# Patient Record
Sex: MALE | Race: WHITE | NOT HISPANIC OR LATINO | Employment: OTHER | URBAN - METROPOLITAN AREA
[De-identification: names, ages, dates, MRNs, and addresses within clinical notes are randomized per-mention and may not be internally consistent; named-entity substitution may affect disease eponyms.]

---

## 2023-02-20 LAB
ALBUMIN (G/DL) IN SER/PLAS: 4.5 G/DL (ref 3.4–5)
ANION GAP IN SER/PLAS: 10 MMOL/L (ref 10–20)
CALCIUM (MG/DL) IN SER/PLAS: 9.8 MG/DL (ref 8.6–10.3)
CARBON DIOXIDE, TOTAL (MMOL/L) IN SER/PLAS: 32 MMOL/L (ref 21–32)
CHLORIDE (MMOL/L) IN SER/PLAS: 102 MMOL/L (ref 98–107)
CREATININE (MG/DL) IN SER/PLAS: 1.34 MG/DL (ref 0.5–1.3)
GFR MALE: 57 ML/MIN/1.73M2
GLUCOSE (MG/DL) IN SER/PLAS: 63 MG/DL (ref 74–99)
PHOSPHATE (MG/DL) IN SER/PLAS: 4 MG/DL (ref 2.5–4.9)
POTASSIUM (MMOL/L) IN SER/PLAS: 3.9 MMOL/L (ref 3.5–5.3)
SODIUM (MMOL/L) IN SER/PLAS: 140 MMOL/L (ref 136–145)
UREA NITROGEN (MG/DL) IN SER/PLAS: 40 MG/DL (ref 6–23)

## 2023-03-07 LAB
ALBUMIN (G/DL) IN SER/PLAS: 4 G/DL (ref 3.4–5)
ANION GAP IN SER/PLAS: 10 MMOL/L (ref 10–20)
CALCIUM (MG/DL) IN SER/PLAS: 9 MG/DL (ref 8.6–10.3)
CARBON DIOXIDE, TOTAL (MMOL/L) IN SER/PLAS: 30 MMOL/L (ref 21–32)
CHLORIDE (MMOL/L) IN SER/PLAS: 105 MMOL/L (ref 98–107)
CREATININE (MG/DL) IN SER/PLAS: 1.08 MG/DL (ref 0.5–1.3)
GFR MALE: 74 ML/MIN/1.73M2
GLUCOSE (MG/DL) IN SER/PLAS: 96 MG/DL (ref 74–99)
PHOSPHATE (MG/DL) IN SER/PLAS: 3.1 MG/DL (ref 2.5–4.9)
POTASSIUM (MMOL/L) IN SER/PLAS: 4 MMOL/L (ref 3.5–5.3)
SODIUM (MMOL/L) IN SER/PLAS: 141 MMOL/L (ref 136–145)
UREA NITROGEN (MG/DL) IN SER/PLAS: 33 MG/DL (ref 6–23)

## 2023-03-09 PROBLEM — N32.81 OVERACTIVE BLADDER: Status: ACTIVE | Noted: 2023-03-09

## 2023-03-09 PROBLEM — K21.9 GERD (GASTROESOPHAGEAL REFLUX DISEASE): Status: ACTIVE | Noted: 2023-03-09

## 2023-03-09 PROBLEM — B35.4 TINEA CORPORIS: Status: ACTIVE | Noted: 2023-03-09

## 2023-03-09 PROBLEM — M79.674 CHRONIC PAIN OF TOE OF RIGHT FOOT: Status: ACTIVE | Noted: 2023-03-09

## 2023-03-09 PROBLEM — R47.1 ATAXIC DYSARTHRIA: Status: ACTIVE | Noted: 2023-03-09

## 2023-03-09 PROBLEM — G20.C PARKINSONISM (MULTI): Status: ACTIVE | Noted: 2023-03-09

## 2023-03-09 PROBLEM — M25.552 HIP PAIN, LEFT: Status: ACTIVE | Noted: 2023-03-09

## 2023-03-09 PROBLEM — R27.0 ATAXIA: Status: ACTIVE | Noted: 2023-03-09

## 2023-03-09 PROBLEM — M25.561 KNEE PAIN, RIGHT: Status: ACTIVE | Noted: 2023-03-09

## 2023-03-09 PROBLEM — R13.10 DYSPHAGIA: Status: ACTIVE | Noted: 2023-03-09

## 2023-03-09 PROBLEM — R20.2 NUMBNESS AND TINGLING: Status: ACTIVE | Noted: 2023-03-09

## 2023-03-09 PROBLEM — R26.89 BALANCE PROBLEM: Status: ACTIVE | Noted: 2023-03-09

## 2023-03-09 PROBLEM — N40.0 BPH (BENIGN PROSTATIC HYPERPLASIA): Status: ACTIVE | Noted: 2023-03-09

## 2023-03-09 PROBLEM — G62.9 POLYNEUROPATHY: Status: ACTIVE | Noted: 2023-03-09

## 2023-03-09 PROBLEM — G23.8 MULTIPLE SYSTEM ATROPHY (MULTI): Status: ACTIVE | Noted: 2023-03-09

## 2023-03-09 PROBLEM — E03.9 HYPOTHYROIDISM: Status: ACTIVE | Noted: 2023-03-09

## 2023-03-09 PROBLEM — A60.00 GENITAL HERPES: Status: ACTIVE | Noted: 2023-03-09

## 2023-03-09 PROBLEM — I78.1 TELANGIECTASIA: Status: ACTIVE | Noted: 2023-03-09

## 2023-03-09 PROBLEM — L30.9 ECZEMA: Status: ACTIVE | Noted: 2023-03-09

## 2023-03-09 PROBLEM — E78.5 BORDERLINE HYPERLIPIDEMIA: Status: ACTIVE | Noted: 2023-03-09

## 2023-03-09 PROBLEM — R55 SYNCOPE AND COLLAPSE: Status: ACTIVE | Noted: 2023-03-09

## 2023-03-09 PROBLEM — R26.81 UNSTEADY GAIT: Status: ACTIVE | Noted: 2023-03-09

## 2023-03-09 PROBLEM — R53.1 WEAKNESS GENERALIZED: Status: ACTIVE | Noted: 2023-03-09

## 2023-03-09 PROBLEM — R13.13 PHARYNGEAL DYSPHAGIA: Status: ACTIVE | Noted: 2023-03-09

## 2023-03-09 PROBLEM — G90.3 MULTIPLE SYSTEM ATROPHY (MULTI): Status: ACTIVE | Noted: 2023-03-09

## 2023-03-09 PROBLEM — R20.0 NUMBNESS AND TINGLING: Status: ACTIVE | Noted: 2023-03-09

## 2023-03-09 PROBLEM — R29.6 REPEATED FALLS: Status: ACTIVE | Noted: 2023-03-09

## 2023-03-09 PROBLEM — G89.29 CHRONIC PAIN OF TOE OF LEFT FOOT: Status: ACTIVE | Noted: 2023-03-09

## 2023-03-09 PROBLEM — R26.2 IMPAIRED AMBULATION: Status: ACTIVE | Noted: 2023-03-09

## 2023-03-09 PROBLEM — G89.29 CHRONIC PAIN OF TOE OF RIGHT FOOT: Status: ACTIVE | Noted: 2023-03-09

## 2023-03-09 PROBLEM — G11.9 CEREBELLAR ATAXIA (MULTI): Status: ACTIVE | Noted: 2023-03-09

## 2023-03-09 PROBLEM — R27.8 DECREASED COORDINATION: Status: ACTIVE | Noted: 2023-03-09

## 2023-03-09 PROBLEM — M79.675 CHRONIC PAIN OF TOE OF LEFT FOOT: Status: ACTIVE | Noted: 2023-03-09

## 2023-03-09 PROBLEM — I87.2 VENOUS INSUFFICIENCY: Status: ACTIVE | Noted: 2023-03-09

## 2023-03-09 PROBLEM — I95.1 ORTHOSTATIC HYPOTENSION: Status: ACTIVE | Noted: 2023-03-09

## 2023-03-09 RX ORDER — VALACYCLOVIR HYDROCHLORIDE 500 MG/1
1 TABLET, FILM COATED ORAL DAILY
COMMUNITY
Start: 2021-02-04 | End: 2023-08-29 | Stop reason: SDUPTHER

## 2023-03-09 RX ORDER — LEVOTHYROXINE SODIUM 25 UG/1
1 TABLET ORAL DAILY
COMMUNITY
Start: 2021-02-04 | End: 2023-05-10 | Stop reason: SDUPTHER

## 2023-03-09 RX ORDER — FLUDROCORTISONE ACETATE 0.1 MG/1
TABLET ORAL
COMMUNITY
Start: 2021-11-29 | End: 2023-06-12 | Stop reason: SDUPTHER

## 2023-03-09 RX ORDER — LANSOPRAZOLE 30 MG/1
1 CAPSULE, DELAYED RELEASE ORAL 2 TIMES DAILY
COMMUNITY
Start: 2021-03-12 | End: 2023-04-17 | Stop reason: SDUPTHER

## 2023-03-09 RX ORDER — TAMSULOSIN HYDROCHLORIDE 0.4 MG/1
0.8 CAPSULE ORAL
COMMUNITY
Start: 2022-10-04 | End: 2023-11-16 | Stop reason: SDUPTHER

## 2023-03-15 ENCOUNTER — APPOINTMENT (OUTPATIENT)
Dept: PRIMARY CARE | Facility: CLINIC | Age: 68
End: 2023-03-15
Payer: MEDICARE

## 2023-03-17 ENCOUNTER — APPOINTMENT (OUTPATIENT)
Dept: PRIMARY CARE | Facility: CLINIC | Age: 68
End: 2023-03-17
Payer: MEDICARE

## 2023-03-21 ENCOUNTER — OFFICE VISIT (OUTPATIENT)
Dept: PRIMARY CARE | Facility: CLINIC | Age: 68
End: 2023-03-21
Payer: MEDICARE

## 2023-03-21 VITALS
SYSTOLIC BLOOD PRESSURE: 98 MMHG | DIASTOLIC BLOOD PRESSURE: 60 MMHG | BODY MASS INDEX: 24.22 KG/M2 | HEIGHT: 71 IN | HEART RATE: 63 BPM | WEIGHT: 173 LBS | RESPIRATION RATE: 18 BRPM | TEMPERATURE: 96.8 F | OXYGEN SATURATION: 96 %

## 2023-03-21 DIAGNOSIS — A60.00 GENITAL HERPES SIMPLEX, UNSPECIFIED SITE: ICD-10-CM

## 2023-03-21 DIAGNOSIS — G90.3 MULTIPLE SYSTEM ATROPHY (MULTI): ICD-10-CM

## 2023-03-21 DIAGNOSIS — R47.1 ATAXIC DYSARTHRIA: ICD-10-CM

## 2023-03-21 DIAGNOSIS — G11.9 CEREBELLAR ATAXIA (MULTI): ICD-10-CM

## 2023-03-21 DIAGNOSIS — K21.9 GASTROESOPHAGEAL REFLUX DISEASE, UNSPECIFIED WHETHER ESOPHAGITIS PRESENT: ICD-10-CM

## 2023-03-21 DIAGNOSIS — Z12.11 COLON CANCER SCREENING: ICD-10-CM

## 2023-03-21 DIAGNOSIS — E03.9 HYPOTHYROIDISM, UNSPECIFIED TYPE: ICD-10-CM

## 2023-03-21 DIAGNOSIS — N32.81 OVERACTIVE BLADDER: ICD-10-CM

## 2023-03-21 DIAGNOSIS — N40.0 BENIGN PROSTATIC HYPERPLASIA, UNSPECIFIED WHETHER LOWER URINARY TRACT SYMPTOMS PRESENT: ICD-10-CM

## 2023-03-21 DIAGNOSIS — E78.5 BORDERLINE HYPERLIPIDEMIA: ICD-10-CM

## 2023-03-21 DIAGNOSIS — G20.C PARKINSONISM, UNSPECIFIED PARKINSONISM TYPE (MULTI): ICD-10-CM

## 2023-03-21 DIAGNOSIS — R53.1 WEAKNESS GENERALIZED: ICD-10-CM

## 2023-03-21 DIAGNOSIS — G62.9 POLYNEUROPATHY: ICD-10-CM

## 2023-03-21 DIAGNOSIS — G23.8 MULTIPLE SYSTEM ATROPHY (MULTI): ICD-10-CM

## 2023-03-21 PROCEDURE — 1036F TOBACCO NON-USER: CPT | Performed by: FAMILY MEDICINE

## 2023-03-21 PROCEDURE — G0439 PPPS, SUBSEQ VISIT: HCPCS | Performed by: FAMILY MEDICINE

## 2023-03-21 PROCEDURE — 1170F FXNL STATUS ASSESSED: CPT | Performed by: FAMILY MEDICINE

## 2023-03-21 PROCEDURE — 1159F MED LIST DOCD IN RCRD: CPT | Performed by: FAMILY MEDICINE

## 2023-03-21 ASSESSMENT — ACTIVITIES OF DAILY LIVING (ADL)
TAKING_MEDICATION: INDEPENDENT
GROCERY_SHOPPING: TOTAL CARE
DOING_HOUSEWORK: TOTAL CARE
BATHING: INDEPENDENT
MANAGING_FINANCES: INDEPENDENT
DRESSING: INDEPENDENT

## 2023-03-21 ASSESSMENT — ENCOUNTER SYMPTOMS
DEPRESSION: 0
LOSS OF SENSATION IN FEET: 1
OCCASIONAL FEELINGS OF UNSTEADINESS: 1

## 2023-03-21 ASSESSMENT — PATIENT HEALTH QUESTIONNAIRE - PHQ9
SUM OF ALL RESPONSES TO PHQ9 QUESTIONS 1 AND 2: 0
2. FEELING DOWN, DEPRESSED OR HOPELESS: NOT AT ALL
1. LITTLE INTEREST OR PLEASURE IN DOING THINGS: NOT AT ALL

## 2023-03-21 NOTE — PATIENT INSTRUCTIONS

## 2023-03-21 NOTE — PROGRESS NOTES
"Subjective   Reason for Visit: Ismael Deras is an 67 y.o. male here for a Medicare Wellness visit.          Reviewed all medications by prescribing practitioner or clinical pharmacist (such as prescriptions, OTCs, herbal therapies and supplements) and documented in the medical record.    HPI  Pt is present for AWV and bilateral swollen feet x6 month  No pain but tingling sensation both feet    Pt fell 6 week ago hurt the back of his head and than 2 week after  That he fell again and hit front of the head and skinned forehead.     Patient Care Team:  Bobby Sylvester MD as PCP - General  Bobby Sylvester MD as PCP - [employer] PCP     Review of Systems    Objective   Vitals:  BP 98/60 (BP Location: Right arm, Patient Position: Sitting)   Pulse 63   Temp 36 °C (96.8 °F) (Temporal)   Resp 18   Ht 1.803 m (5' 11\")   Wt 78.5 kg (173 lb)   SpO2 96%   BMI 24.13 kg/m²       Physical Exam  Patient in for annual Medicare wellness exam history of ataxia with falls parkinsonism.  Following up with neurology in May still having dizziness and balance issues.  Complains of swelling in his legs and feet.  He is alert and oriented he is here with his sister who is his power of .  Lungs are clear cardiac exam regular rate and rhythm abdominal exam soft nontender no pedal splenomegaly or masses he has a history of BPH and overactive bladder seeing urology for that.  Mental status normal mood and affect ANO x3.  Neurologically he has difficulty walking uses a walker balance difficulties dizziness slowly progressing with this cerebellar ataxia progression.  Extremities he still has some mild edema below the ankles part of this is related to medication part of it is due to he refused to keep his feet up when he is sitting at home partly due to his being sedentary.  No diuretic therapy at this point it this is minimal skin is not in any danger of breakdown.    Plan is continue current " medication  Assessment/Plan no changes await recommendations by specialist routine recheck with me  Problem List Items Addressed This Visit          Nervous    Ataxic dysarthria    Cerebellar ataxia (CMS/HCC)    Multiple system atrophy (CMS/HCC)    Parkinsonism (CMS/HCC)    Polyneuropathy       Digestive    GERD (gastroesophageal reflux disease)       Genitourinary    BPH (benign prostatic hyperplasia)    Genital herpes    Overactive bladder       Endocrine/Metabolic    Hypothyroidism       Other    Borderline hyperlipidemia    Weakness generalized     Other Visit Diagnoses       Colon cancer screening                   Patient was identified as a fall risk. Risk prevention instructions provided.

## 2023-04-17 DIAGNOSIS — K21.9 GASTROESOPHAGEAL REFLUX DISEASE, UNSPECIFIED WHETHER ESOPHAGITIS PRESENT: ICD-10-CM

## 2023-04-17 RX ORDER — LANSOPRAZOLE 30 MG/1
CAPSULE, DELAYED RELEASE ORAL
Qty: 180 CAPSULE | Refills: 1 | Status: SHIPPED | OUTPATIENT
Start: 2023-04-17 | End: 2023-11-16 | Stop reason: SDUPTHER

## 2023-04-17 RX ORDER — PANTOPRAZOLE SODIUM 40 MG/1
40 TABLET, DELAYED RELEASE ORAL
Qty: 90 TABLET | Refills: 1 | Status: SHIPPED | OUTPATIENT
Start: 2023-04-17 | End: 2023-11-16 | Stop reason: SDUPTHER

## 2023-04-17 RX ORDER — LANSOPRAZOLE 30 MG/1
30 CAPSULE, DELAYED RELEASE ORAL 2 TIMES DAILY
Qty: 180 CAPSULE | Refills: 1 | Status: SHIPPED | OUTPATIENT
Start: 2023-04-17 | End: 2023-11-16 | Stop reason: WASHOUT

## 2023-04-17 RX ORDER — LANSOPRAZOLE 30 MG/1
30 CAPSULE, DELAYED RELEASE ORAL 2 TIMES DAILY
Qty: 180 CAPSULE | Refills: 1 | Status: SHIPPED | OUTPATIENT
Start: 2023-04-17 | End: 2023-04-17

## 2023-04-17 NOTE — TELEPHONE ENCOUNTER
Rx Refill Request Telephone Encounter    Name:  Ismael Deras  : 1955     Medication Name:  ansoprazole (Prevacid)  Dose (Optional):    30 MG  Quantity (Optional):    180  Directions (Optional):   Take 1 capsule (30 mg) by mouth in the morning and 1 capsule (30 mg) before bedtime.    ALLERGIES:   NO KNOWN ALLERGIES     Specific Pharmacy location:  [facility]    Date of last appointment:  3/21/23  Date of next appointment:      Best number to reach patient:  [phone]

## 2023-04-17 NOTE — TELEPHONE ENCOUNTER
Pt is calling because this rx for Prevacid was supposed to be sent to [facility]  Please send to [facility]    Thanks

## 2023-04-17 NOTE — TELEPHONE ENCOUNTER
Rx Refill Request Telephone Encounter    Name:  Ismael Deras  : 1955     Medication Name:  lansoprazole (Prevacid)  Dose (Optional):    30 MG  Quantity (Optional):    90  Directions (Optional):   Take 1 capsule (30 mg) by mouth in the morning and 1 capsule (30 mg) before bedtime.    ALLERGIES:   NO KNOWN ALLERGIES     Specific Pharmacy location:  [facility]    Date of last appointment:  3/21/2023  Date of next appointment:      Best number to reach patient:  [phone]

## 2023-04-27 ENCOUNTER — LAB (OUTPATIENT)
Dept: LAB | Facility: LAB | Age: 68
End: 2023-04-27
Payer: MEDICARE

## 2023-04-27 DIAGNOSIS — N32.81 OVERACTIVE BLADDER: ICD-10-CM

## 2023-04-27 DIAGNOSIS — E78.5 BORDERLINE HYPERLIPIDEMIA: ICD-10-CM

## 2023-04-27 DIAGNOSIS — E03.9 HYPOTHYROIDISM, UNSPECIFIED TYPE: ICD-10-CM

## 2023-04-27 DIAGNOSIS — N40.0 BENIGN PROSTATIC HYPERPLASIA, UNSPECIFIED WHETHER LOWER URINARY TRACT SYMPTOMS PRESENT: ICD-10-CM

## 2023-04-27 LAB
ALANINE AMINOTRANSFERASE (SGPT) (U/L) IN SER/PLAS: 14 U/L (ref 10–52)
ALBUMIN (G/DL) IN SER/PLAS: 4.3 G/DL (ref 3.4–5)
ALKALINE PHOSPHATASE (U/L) IN SER/PLAS: 103 U/L (ref 33–136)
ANION GAP IN SER/PLAS: 11 MMOL/L (ref 10–20)
ASPARTATE AMINOTRANSFERASE (SGOT) (U/L) IN SER/PLAS: 17 U/L (ref 9–39)
BASOPHILS (10*3/UL) IN BLOOD BY AUTOMATED COUNT: 0.04 X10E9/L (ref 0–0.1)
BASOPHILS/100 LEUKOCYTES IN BLOOD BY AUTOMATED COUNT: 0.6 % (ref 0–2)
BILIRUBIN TOTAL (MG/DL) IN SER/PLAS: 0.7 MG/DL (ref 0–1.2)
CALCIUM (MG/DL) IN SER/PLAS: 9.4 MG/DL (ref 8.6–10.3)
CARBON DIOXIDE, TOTAL (MMOL/L) IN SER/PLAS: 31 MMOL/L (ref 21–32)
CHLORIDE (MMOL/L) IN SER/PLAS: 102 MMOL/L (ref 98–107)
CHOLESTEROL (MG/DL) IN SER/PLAS: 158 MG/DL (ref 0–199)
CHOLESTEROL IN HDL (MG/DL) IN SER/PLAS: 64.9 MG/DL
CHOLESTEROL/HDL RATIO: 2.4
CREATININE (MG/DL) IN SER/PLAS: 1.08 MG/DL (ref 0.5–1.3)
EOSINOPHILS (10*3/UL) IN BLOOD BY AUTOMATED COUNT: 0.23 X10E9/L (ref 0–0.7)
EOSINOPHILS/100 LEUKOCYTES IN BLOOD BY AUTOMATED COUNT: 3.7 % (ref 0–6)
ERYTHROCYTE DISTRIBUTION WIDTH (RATIO) BY AUTOMATED COUNT: 13 % (ref 11.5–14.5)
ERYTHROCYTE MEAN CORPUSCULAR HEMOGLOBIN CONCENTRATION (G/DL) BY AUTOMATED: 33.3 G/DL (ref 32–36)
ERYTHROCYTE MEAN CORPUSCULAR VOLUME (FL) BY AUTOMATED COUNT: 93 FL (ref 80–100)
ERYTHROCYTES (10*6/UL) IN BLOOD BY AUTOMATED COUNT: 4.29 X10E12/L (ref 4.5–5.9)
GFR MALE: 74 ML/MIN/1.73M2
GLUCOSE (MG/DL) IN SER/PLAS: 80 MG/DL (ref 74–99)
HEMATOCRIT (%) IN BLOOD BY AUTOMATED COUNT: 40 % (ref 41–52)
HEMOGLOBIN (G/DL) IN BLOOD: 13.3 G/DL (ref 13.5–17.5)
IMMATURE GRANULOCYTES/100 LEUKOCYTES IN BLOOD BY AUTOMATED COUNT: 0.2 % (ref 0–0.9)
LDL: 82 MG/DL (ref 0–99)
LEUKOCYTES (10*3/UL) IN BLOOD BY AUTOMATED COUNT: 6.3 X10E9/L (ref 4.4–11.3)
LYMPHOCYTES (10*3/UL) IN BLOOD BY AUTOMATED COUNT: 0.98 X10E9/L (ref 1.2–4.8)
LYMPHOCYTES/100 LEUKOCYTES IN BLOOD BY AUTOMATED COUNT: 15.7 % (ref 13–44)
MONOCYTES (10*3/UL) IN BLOOD BY AUTOMATED COUNT: 0.39 X10E9/L (ref 0.1–1)
MONOCYTES/100 LEUKOCYTES IN BLOOD BY AUTOMATED COUNT: 6.2 % (ref 2–10)
NEUTROPHILS (10*3/UL) IN BLOOD BY AUTOMATED COUNT: 4.61 X10E9/L (ref 1.2–7.7)
NEUTROPHILS/100 LEUKOCYTES IN BLOOD BY AUTOMATED COUNT: 73.6 % (ref 40–80)
PLATELETS (10*3/UL) IN BLOOD AUTOMATED COUNT: 228 X10E9/L (ref 150–450)
POTASSIUM (MMOL/L) IN SER/PLAS: 4.3 MMOL/L (ref 3.5–5.3)
PROSTATE SPECIFIC ANTIGEN,SCREEN: 1.5 NG/ML (ref 0–4)
PROTEIN TOTAL: 6.8 G/DL (ref 6.4–8.2)
SODIUM (MMOL/L) IN SER/PLAS: 140 MMOL/L (ref 136–145)
TRIGLYCERIDE (MG/DL) IN SER/PLAS: 54 MG/DL (ref 0–149)
UREA NITROGEN (MG/DL) IN SER/PLAS: 32 MG/DL (ref 6–23)
VLDL: 11 MG/DL (ref 0–40)

## 2023-04-27 PROCEDURE — 85025 COMPLETE CBC W/AUTO DIFF WBC: CPT

## 2023-04-27 PROCEDURE — 80053 COMPREHEN METABOLIC PANEL: CPT

## 2023-04-27 PROCEDURE — 36415 COLL VENOUS BLD VENIPUNCTURE: CPT

## 2023-04-27 PROCEDURE — 84153 ASSAY OF PSA TOTAL: CPT

## 2023-04-27 PROCEDURE — 80061 LIPID PANEL: CPT

## 2023-05-10 DIAGNOSIS — E03.9 HYPOTHYROIDISM, UNSPECIFIED TYPE: ICD-10-CM

## 2023-05-10 RX ORDER — LEVOTHYROXINE SODIUM 25 UG/1
25 TABLET ORAL DAILY
Qty: 90 TABLET | Refills: 1 | Status: SHIPPED | OUTPATIENT
Start: 2023-05-10 | End: 2023-11-13

## 2023-05-10 NOTE — TELEPHONE ENCOUNTER
Rx Refill Request Telephone Encounter    Name:  Ismael Deras  : 1955     Medication Name:  LEVOTHYROXINE   Dose (Optional):    25 MCG  Quantity (Optional):    90  Directions (Optional):   TAKE 1 DAILY    ALLERGIES:   NKDA    Specific Pharmacy location:  [facility]    Date of last appointment:  2023  Date of next appointment:  NONE    Best number to reach patient:  [phone] (home)

## 2023-06-12 DIAGNOSIS — I95.1 ORTHOSTATIC HYPOTENSION: ICD-10-CM

## 2023-06-12 RX ORDER — FLUDROCORTISONE ACETATE 0.1 MG/1
0.2 TABLET ORAL 2 TIMES DAILY
Qty: 180 TABLET | Refills: 0 | Status: SHIPPED | OUTPATIENT
Start: 2023-06-12 | End: 2023-11-16 | Stop reason: SDUPTHER

## 2023-07-05 LAB
ALBUMIN (G/DL) IN SER/PLAS: 3.9 G/DL (ref 3.4–5)
ANION GAP IN SER/PLAS: 8 MMOL/L (ref 10–20)
CALCIUM (MG/DL) IN SER/PLAS: 8.8 MG/DL (ref 8.6–10.3)
CARBON DIOXIDE, TOTAL (MMOL/L) IN SER/PLAS: 32 MMOL/L (ref 21–32)
CHLORIDE (MMOL/L) IN SER/PLAS: 104 MMOL/L (ref 98–107)
CREATININE (MG/DL) IN SER/PLAS: 0.94 MG/DL (ref 0.5–1.3)
GFR MALE: 87 ML/MIN/1.73M2
GLUCOSE (MG/DL) IN SER/PLAS: 80 MG/DL (ref 74–99)
PHOSPHATE (MG/DL) IN SER/PLAS: 3.3 MG/DL (ref 2.5–4.9)
POTASSIUM (MMOL/L) IN SER/PLAS: 4.1 MMOL/L (ref 3.5–5.3)
SODIUM (MMOL/L) IN SER/PLAS: 140 MMOL/L (ref 136–145)
UREA NITROGEN (MG/DL) IN SER/PLAS: 24 MG/DL (ref 6–23)

## 2023-08-29 DIAGNOSIS — B00.9 HERPES: Primary | ICD-10-CM

## 2023-08-29 DIAGNOSIS — A60.00 GENITAL HERPES SIMPLEX, UNSPECIFIED SITE: ICD-10-CM

## 2023-08-29 RX ORDER — VALACYCLOVIR HYDROCHLORIDE 500 MG/1
500 TABLET, FILM COATED ORAL DAILY
Qty: 90 TABLET | Refills: 0 | Status: SHIPPED | OUTPATIENT
Start: 2023-08-29 | End: 2023-11-16 | Stop reason: SDUPTHER

## 2023-08-29 RX ORDER — VALACYCLOVIR HYDROCHLORIDE 500 MG/1
500 TABLET, FILM COATED ORAL DAILY
Qty: 90 TABLET | Refills: 0 | Status: SHIPPED | OUTPATIENT
Start: 2023-08-29 | End: 2023-11-14 | Stop reason: SDUPTHER

## 2023-08-29 NOTE — TELEPHONE ENCOUNTER
Pt is Dr Sylvester    valACYclovir (Valtrex) 500 mg tablet   2/4/2021     Sig - Route: Take 1 tablet (500 mg) by mouth once daily. - oral       Pt call requesting refill for 90 days supply    [id number] - [place], [place] -

## 2023-10-22 DIAGNOSIS — K21.9 GASTROESOPHAGEAL REFLUX DISEASE WITHOUT ESOPHAGITIS: Primary | ICD-10-CM

## 2023-10-24 RX ORDER — LANSOPRAZOLE 30 MG/1
30 CAPSULE, DELAYED RELEASE ORAL 2 TIMES DAILY
Qty: 180 CAPSULE | Refills: 0 | Status: SHIPPED | OUTPATIENT
Start: 2023-10-24 | End: 2023-11-14 | Stop reason: SDUPTHER

## 2023-11-12 DIAGNOSIS — E03.9 HYPOTHYROIDISM, UNSPECIFIED TYPE: ICD-10-CM

## 2023-11-13 RX ORDER — LEVOTHYROXINE SODIUM 25 UG/1
25 TABLET ORAL DAILY
Qty: 30 TABLET | Refills: 0 | Status: SHIPPED | OUTPATIENT
Start: 2023-11-13 | End: 2023-11-16 | Stop reason: SDUPTHER

## 2023-11-15 ENCOUNTER — TELEPHONE (OUTPATIENT)
Dept: PRIMARY CARE | Facility: CLINIC | Age: 68
End: 2023-11-15

## 2023-11-15 DIAGNOSIS — I95.1 ORTHOSTATIC HYPOTENSION: ICD-10-CM

## 2023-11-15 NOTE — PROGRESS NOTES
"Subjective   Patient ID: Ismael Deras is a 68 y.o. male who presents for Tremors, Hyperlipidemia, and GERD.    HPI  Presents today for above reason    Pt tries to follow low fat/sugar/salt diet  Exercise minimal  Denies SOB/chest pain/palpitations  Pt is not a smoker  No urinary or bowel issues per pt.  Sleeping well  Medications working well    Pt states neck still hurts from fall 6 months ago  No xrays done at that  Pt fell backwards and hit his head.  States he felt something \"snap\"    Pt states ankles and feet are still swollen  Does not wear compression stockings  Some elevation but not normally  Ongoing x6 months    No other concerns today    Flu vaccine declined    Review of Systems  Constitutional:  no chills, no fever and no night sweats.  Eyes: no blurred vision and no eyesight problems.  ENT: no hearing loss, no nasal congestion, no hoarseness and no sore throat.  Neck: no mass (es) and no swelling.  Cardiovascular: no chest pain, no intermittent leg claudication, no lower extremity edema, no palpitation and no syncope.  Respiratory: no cough, no shortness of breath during exertion, no shortness of breath at rest and no wheezing.  Gastrointestinal: no abdominal pain, no blood in stools, no constipation, no diarrhea, no melena, no nausea, no rectal pain and no vomiting.  Genitourinary: no dysuria, no change in urinary frequency, no urinary hesitancy and no feelings of urinary urgency.  Musculoskeletal: no arthralgias, no back pain and no myalgias.  Integumentary: no new skin lesions and no rashes.  Neurological: no difficulty walking, no headache, no limb weakness, no numbness and no tingling.  Psychiatric/Behavioral: no anxiety, no depression, no anhedonia and no substance use disorders.  Endocrine: no recent weight gain and no recent weight loss.  Hematologic/Lymphatic: no tendency for easy bruising and no swollen glands    Objective   Physical Exam  Patient in for follow-up of spinal cerebellar disease " he is now wheelchair-bound he can with assistance stand to transfer to the toilet for the bed.  But cannot walk.  Has follow-up appointment with neurology after the first of the year continues to have the persistent edema from the mid shins down through the feet good capillary refill.  He is sedentary sits all day advised she is needs to keep his feet elevated if caregiver says he had not been doing that he was advised if he does not get the swelling down he is at risk for fissuring in the skin infection and ulcer formation which could mean he cannot stay in independent living.  He states he will comply.  /74   Pulse 74   Temp 36.4 °C (97.6 °F)   Resp 20   Wt 78.5 kg (173 lb)   SpO2 93%   BMI 24.13 kg/m²     Lab Results   Component Value Date    WBC 6.3 04/27/2023    HGB 13.3 (L) 04/27/2023    HCT 40.0 (L) 04/27/2023    MCV 93 04/27/2023     04/27/2023       Assessment/Plan plan is to add 40 of Lasix every morning and follow-up in a week to see how he is doing caregiver will give us a call let us know how the swelling is.  Problem List Items Addressed This Visit       Genital herpes    GERD (gastroesophageal reflux disease)    Hypothyroidism    Orthostatic hypotension     Other Visit Diagnoses       Encounter for immunization

## 2023-11-15 NOTE — TELEPHONE ENCOUNTER
Patient requesting a refill for Florinef 0.1 mg     Rx states 1 BID.  Patient states he is taking 2 BID     Please advise which directions are correct.       [facility] to [facility] [address]

## 2023-11-16 ENCOUNTER — OFFICE VISIT (OUTPATIENT)
Dept: PRIMARY CARE | Facility: CLINIC | Age: 68
End: 2023-11-16
Payer: MEDICARE

## 2023-11-16 VITALS
BODY MASS INDEX: 24.13 KG/M2 | DIASTOLIC BLOOD PRESSURE: 74 MMHG | TEMPERATURE: 97.6 F | SYSTOLIC BLOOD PRESSURE: 110 MMHG | RESPIRATION RATE: 20 BRPM | OXYGEN SATURATION: 93 % | WEIGHT: 173 LBS | HEART RATE: 74 BPM

## 2023-11-16 DIAGNOSIS — A60.00 GENITAL HERPES SIMPLEX, UNSPECIFIED SITE: ICD-10-CM

## 2023-11-16 DIAGNOSIS — R60.0 LOCALIZED EDEMA: Primary | ICD-10-CM

## 2023-11-16 DIAGNOSIS — Z23 ENCOUNTER FOR IMMUNIZATION: ICD-10-CM

## 2023-11-16 DIAGNOSIS — R39.14 BENIGN PROSTATIC HYPERPLASIA WITH INCOMPLETE BLADDER EMPTYING: ICD-10-CM

## 2023-11-16 DIAGNOSIS — I95.1 ORTHOSTATIC HYPOTENSION: ICD-10-CM

## 2023-11-16 DIAGNOSIS — N40.1 BENIGN PROSTATIC HYPERPLASIA WITH INCOMPLETE BLADDER EMPTYING: ICD-10-CM

## 2023-11-16 DIAGNOSIS — K21.9 GASTROESOPHAGEAL REFLUX DISEASE, UNSPECIFIED WHETHER ESOPHAGITIS PRESENT: ICD-10-CM

## 2023-11-16 DIAGNOSIS — E03.9 HYPOTHYROIDISM, UNSPECIFIED TYPE: ICD-10-CM

## 2023-11-16 PROCEDURE — 1036F TOBACCO NON-USER: CPT | Performed by: FAMILY MEDICINE

## 2023-11-16 PROCEDURE — 99214 OFFICE O/P EST MOD 30 MIN: CPT | Performed by: FAMILY MEDICINE

## 2023-11-16 PROCEDURE — 1159F MED LIST DOCD IN RCRD: CPT | Performed by: FAMILY MEDICINE

## 2023-11-16 RX ORDER — FUROSEMIDE 40 MG/1
40 TABLET ORAL DAILY
Qty: 30 TABLET | Refills: 1 | Status: SHIPPED | OUTPATIENT
Start: 2023-11-16 | End: 2023-12-11 | Stop reason: SDUPTHER

## 2023-11-16 RX ORDER — TAMSULOSIN HYDROCHLORIDE 0.4 MG/1
0.8 CAPSULE ORAL DAILY
Qty: 90 CAPSULE | Refills: 1 | Status: SHIPPED | OUTPATIENT
Start: 2023-11-16

## 2023-11-16 RX ORDER — FLUDROCORTISONE ACETATE 0.1 MG/1
0.2 TABLET ORAL 2 TIMES DAILY
Qty: 180 TABLET | Refills: 0 | Status: SHIPPED | OUTPATIENT
Start: 2023-11-16 | End: 2024-01-12 | Stop reason: SDUPTHER

## 2023-11-16 RX ORDER — LEVOTHYROXINE SODIUM 25 UG/1
25 TABLET ORAL DAILY
Qty: 90 TABLET | Refills: 1 | Status: SHIPPED | OUTPATIENT
Start: 2023-11-16

## 2023-11-16 RX ORDER — PANTOPRAZOLE SODIUM 40 MG/1
40 TABLET, DELAYED RELEASE ORAL
Qty: 90 TABLET | Refills: 1 | Status: SHIPPED | OUTPATIENT
Start: 2023-11-16

## 2023-11-16 RX ORDER — LANSOPRAZOLE 30 MG/1
CAPSULE, DELAYED RELEASE ORAL
Qty: 180 CAPSULE | Refills: 1 | Status: SHIPPED | OUTPATIENT
Start: 2023-11-16 | End: 2024-01-12 | Stop reason: SDUPTHER

## 2023-11-16 RX ORDER — VALACYCLOVIR HYDROCHLORIDE 500 MG/1
500 TABLET, FILM COATED ORAL DAILY
Qty: 90 TABLET | Refills: 1 | Status: SHIPPED | OUTPATIENT
Start: 2023-11-16

## 2023-11-16 NOTE — TELEPHONE ENCOUNTER
"Tried to call pharmacy. Automated system states \"I'm sorry, but I cannot connect your call right now\".   "

## 2023-11-16 NOTE — TELEPHONE ENCOUNTER
Attempted to call pharmacy.  Automated system could not connect to the pharmacy.  Need to try again later today.

## 2023-11-17 NOTE — TELEPHONE ENCOUNTER
[facility] tablet BID[facility]   LMOM for patient to call back. Need to inform him it is only 1 tablet BID. RX was sent in on 11-16-23.

## 2023-12-04 ENCOUNTER — APPOINTMENT (OUTPATIENT)
Dept: PRIMARY CARE | Facility: CLINIC | Age: 68
End: 2023-12-04
Payer: MEDICARE

## 2023-12-11 ENCOUNTER — OFFICE VISIT (OUTPATIENT)
Dept: PRIMARY CARE | Facility: CLINIC | Age: 68
End: 2023-12-11
Payer: MEDICARE

## 2023-12-11 VITALS
HEIGHT: 72 IN | BODY MASS INDEX: 23.7 KG/M2 | SYSTOLIC BLOOD PRESSURE: 136 MMHG | DIASTOLIC BLOOD PRESSURE: 88 MMHG | HEART RATE: 84 BPM | WEIGHT: 175 LBS | TEMPERATURE: 97.2 F | RESPIRATION RATE: 16 BRPM | OXYGEN SATURATION: 98 %

## 2023-12-11 DIAGNOSIS — R60.0 LOCALIZED EDEMA: ICD-10-CM

## 2023-12-11 DIAGNOSIS — E78.5 BORDERLINE HYPERLIPIDEMIA: Primary | ICD-10-CM

## 2023-12-11 PROCEDURE — 99213 OFFICE O/P EST LOW 20 MIN: CPT | Performed by: FAMILY MEDICINE

## 2023-12-11 PROCEDURE — 1159F MED LIST DOCD IN RCRD: CPT | Performed by: FAMILY MEDICINE

## 2023-12-11 PROCEDURE — 1036F TOBACCO NON-USER: CPT | Performed by: FAMILY MEDICINE

## 2023-12-11 RX ORDER — FUROSEMIDE 40 MG/1
40 TABLET ORAL DAILY
Qty: 30 TABLET | Refills: 3 | Status: SHIPPED | OUTPATIENT
Start: 2023-12-11 | End: 2024-12-10

## 2023-12-11 NOTE — PROGRESS NOTES
Subjective   Patient ID: Ismael Deras is a 68 y.o. male who presents for Joint Swelling and Groin Swelling.  HPI    Pt reports he has had bilateral ankle swelling, tightness/tingling x2 weeks    Also notes a golf ball sized lump in groin area    Review of Systems  Constitutional:  no chills, no fever and no night sweats.  Eyes: no blurred vision and no eyesight problems.  ENT: no hearing loss, no nasal congestion, no hoarseness and no sore throat.  Neck: no mass (es) and no swelling.  Cardiovascular: no chest pain, no intermittent leg claudication, no lower extremity edema, no palpitation and no syncope.  Respiratory: no cough, no shortness of breath during exertion, no shortness of breath at rest and no wheezing.  Gastrointestinal: no abdominal pain, no blood in stools, no constipation, no diarrhea, no melena, no nausea, no rectal pain and no vomiting.  Genitourinary: no dysuria, no change in urinary frequency, no urinary hesitancy and no feelings of urinary urgency.  Musculoskeletal: no arthralgias, no back pain and no myalgias.  Integumentary: no new skin lesions and no rashes.  Neurological: no difficulty walking, no headache, no limb weakness, no numbness and no tingling.  Psychiatric/Behavioral: no anxiety, no depression, no anhedonia and no substance use disorders.  Endocrine: no recent weight gain and no recent weight loss.  Hematologic/Lymphatic: no tendency for easy bruising and no swollen glands    Objective   Physical Exam  Patient in for follow-up overall improved from the peripheral edema but slightly worse ran out of his Lasix a week ago and did not call we will get him restarted on it he is due to see the podiatrist for nail trimming also.  There is no skin breakdown.  Complains of some bulge occasionally uncomfortable in the right inguinal area history of bilateral inguinal hernia repair 30 years ago.  Exam he has no palpable bulging or mass currently he is pretty much wheelchair-bound just when he  "transfers in and out not sure if that is when he notices it or not.  No obvious abdominal discomfort no rebound no guarding no palpable mass.  /88 (BP Location: Right arm, Patient Position: Sitting, BP Cuff Size: Adult)   Pulse 84   Temp 36.2 °C (97.2 °F) (Temporal)   Resp 16   Ht 1.816 m (5' 11.5\")   Wt 79.4 kg (175 lb) Comment: pt reported  SpO2 98%   BMI 24.07 kg/m²     Lab Results   Component Value Date    WBC 6.3 04/27/2023    HGB 13.3 (L) 04/27/2023    HCT 40.0 (L) 04/27/2023    MCV 93 04/27/2023     04/27/2023       Assessment/Plan plan is continue current treatment course restart the Lasix his caregiver will give us a call and we can see how he is doing.  Problem List Items Addressed This Visit       Borderline hyperlipidemia - Primary     Other Visit Diagnoses       Localized edema        Relevant Medications    furosemide (Lasix) 40 mg tablet            "

## 2023-12-13 PROBLEM — R60.9 PERIPHERAL EDEMA: Status: ACTIVE | Noted: 2023-12-13

## 2023-12-15 NOTE — PROGRESS NOTES
Subjective   Patient ID: Ismael Deras is a 68 y.o. male who presents for Joint Swelling and Groin Swelling.  HPI    Pt reports he has had bilateral ankle swelling, tightness/tingling x2 weeks    Also notes a golf ball sized lump in groin area    Review of Systems  Constitutional:  no chills, no fever and no night sweats.  Eyes: no blurred vision and no eyesight problems.  ENT: no hearing loss, no nasal congestion, no hoarseness and no sore throat.  Neck: no mass (es) and no swelling.  Cardiovascular: no chest pain, no intermittent leg claudication, no lower extremity edema, no palpitation and no syncope.  Respiratory: no cough, no shortness of breath during exertion, no shortness of breath at rest and no wheezing.  Gastrointestinal: no abdominal pain, no blood in stools, no constipation, no diarrhea, no melena, no nausea, no rectal pain and no vomiting.  Genitourinary: no dysuria, no change in urinary frequency, no urinary hesitancy and no feelings of urinary urgency.  Musculoskeletal: no arthralgias, no back pain and no myalgias.  Integumentary: no new skin lesions and no rashes.  Neurological: no difficulty walking, no headache, no limb weakness, no numbness and no tingling.  Psychiatric/Behavioral: no anxiety, no depression, no anhedonia and no substance use disorders.  Endocrine: no recent weight gain and no recent weight loss.  Hematologic/Lymphatic: no tendency for easy bruising and no swollen glands    Objective   Physical Exam  Patient in for follow-up overall improved from the peripheral edema but slightly worse ran out of his Lasix a week ago and did not call we will get him restarted on it he is due to see the podiatrist for nail trimming also.  There is no skin breakdown.  Complains of some bulge occasionally uncomfortable in the right inguinal area history of bilateral inguinal hernia repair 30 years ago.  Exam he has no palpable bulging or mass currently he is pretty much wheelchair-bound just when he  "transfers in and out not sure if that is when he notices it or not.  No obvious abdominal discomfort no rebound no guarding no palpable mass.  /88 (BP Location: Right arm, Patient Position: Sitting, BP Cuff Size: Adult)   Pulse 84   Temp 36.2 °C (97.2 °F) (Temporal)   Resp 16   Ht 1.816 m (5' 11.5\")   Wt 79.4 kg (175 lb) Comment: pt reported  SpO2 98%   BMI 24.07 kg/m²     Lab Results   Component Value Date    WBC 6.3 04/27/2023    HGB 13.3 (L) 04/27/2023    HCT 40.0 (L) 04/27/2023    MCV 93 04/27/2023     04/27/2023         plan is continue current treatment course restart the Lasix his caregiver will give us a call and we can see how he is doing.  Problem List Items Addressed This Visit         Borderline hyperlipidemia - Primary      Other Visit Diagnoses         Localized edema         Relevant Medications     furosemide (Lasix) 40 mg tablet     "

## 2023-12-21 ENCOUNTER — OFFICE VISIT (OUTPATIENT)
Dept: UROLOGY | Facility: CLINIC | Age: 68
End: 2023-12-21
Payer: MEDICARE

## 2023-12-21 VITALS
WEIGHT: 175 LBS | BODY MASS INDEX: 24.5 KG/M2 | SYSTOLIC BLOOD PRESSURE: 110 MMHG | HEIGHT: 71 IN | TEMPERATURE: 97.5 F | DIASTOLIC BLOOD PRESSURE: 73 MMHG | HEART RATE: 71 BPM

## 2023-12-21 DIAGNOSIS — N32.81 OVERACTIVE BLADDER: ICD-10-CM

## 2023-12-21 DIAGNOSIS — R35.0 FREQUENCY OF MICTURITION: ICD-10-CM

## 2023-12-21 PROCEDURE — 1159F MED LIST DOCD IN RCRD: CPT | Performed by: UROLOGY

## 2023-12-21 PROCEDURE — 99213 OFFICE O/P EST LOW 20 MIN: CPT | Performed by: UROLOGY

## 2023-12-21 PROCEDURE — 1036F TOBACCO NON-USER: CPT | Performed by: UROLOGY

## 2023-12-21 NOTE — PROGRESS NOTES
Subjective   Patient ID: Ismael Deras is a 68 y.o. male who presents for No chief complaint on file.. Last seen 6/29/23 when The urodynamics test revealed that the patient's bladder has spasticity which generates incontinence. I explained the mechanics of the patient's bladder according to the test results. This is not an obstructive problem so surgery will not help. We discussed the options of medication, Botox which would require self-catheterization, overnight catheterization, and InterStim therapy, including the pros and cons of each option. At this time he would like to continue using the condom catheter. I reminded the patient that the condom catheter is fine with good hygiene and using baby wipes and powder. He will remain on Flomax 0.4 mg nightly, and monitor his symptoms.   I reminded the patient to try to drink more earlier in the day. We also discussed the reasons why there is greater output of urine overnight. I encouraged the patient to elevate his feet during the day and in the evening.      He is accompanied by his sister. He reports difficulty with self-catheterization and recent leakage of urine at bedtime. His sister reports progressively increasing difficulty with getting him to the bathroom. Patient reports he is unable to get to the bathroom to urinate during the day. He denies hematuria, dysuria and burning sensation during urination.  He was recently diagnosed with an uncomplicated hernia.         Review of Systems  A 12 system review was completed and is negative with the exception of those signs and symptoms noted in the history of present illness.    Objective   Physical Exam  General: in NAD, appears stated age  Head: normocephalic, atraumatic  Respiratory: normal effort, no use of accessory muscles  Cardiovascular: no edema noted  Skin: normal turgor, no rashes  Neurologic: grossly intact, oriented to person/place/time  Psychiatric: mode and affect appropriate     Assessment/Plan    Problem List Items Addressed This Visit             ICD-10-CM    Overactive bladder N32.81    Relevant Orders    POCT UA Automated manually resulted    Post-Void Residual (Completed)     Other Visit Diagnoses         Codes    Frequency of micturition     R35.0    Relevant Orders    Post-Void Residual (Completed)          Residual today is 331 cc but patient did not void.  He is using a condom catheter at night due to incontinence.  This is working well for him.  He will continue to change daily.  He is starting to have some decline in his daytime mobility.  Currently, he is able to make it to the restroom without.  We will continue to monitor this closely.  Follow-up in 6 months or call sooner if concerns arise.    Scribe Attestation  By signing my name below, IGisell Scribe   attest that this documentation has been prepared under the direction and in the presence of Nathanael Stevens MD.    Scribe Attestation  By signing my name below, IYessica Scribe   attest that this documentation has been prepared under the direction and in the presence of Nathanael Stevens MD.

## 2024-01-08 ENCOUNTER — OFFICE VISIT (OUTPATIENT)
Dept: NEUROLOGY | Facility: CLINIC | Age: 69
End: 2024-01-08
Payer: MEDICARE

## 2024-01-08 VITALS
BODY MASS INDEX: 24.36 KG/M2 | DIASTOLIC BLOOD PRESSURE: 88 MMHG | HEIGHT: 71 IN | HEART RATE: 87 BPM | WEIGHT: 174 LBS | SYSTOLIC BLOOD PRESSURE: 139 MMHG

## 2024-01-08 DIAGNOSIS — G90.3 MULTIPLE SYSTEM ATROPHY (MULTI): ICD-10-CM

## 2024-01-08 DIAGNOSIS — G23.8 MULTIPLE SYSTEM ATROPHY (MULTI): ICD-10-CM

## 2024-01-08 DIAGNOSIS — R32 URINARY INCONTINENCE, UNSPECIFIED TYPE: ICD-10-CM

## 2024-01-08 DIAGNOSIS — I95.1 ORTHOSTATIC HYPOTENSION: ICD-10-CM

## 2024-01-08 DIAGNOSIS — G20.A1 PARKINSON'S DISEASE WITHOUT DYSKINESIA OR FLUCTUATING MANIFESTATIONS (MULTI): Primary | ICD-10-CM

## 2024-01-08 PROCEDURE — 1157F ADVNC CARE PLAN IN RCRD: CPT | Performed by: PSYCHIATRY & NEUROLOGY

## 2024-01-08 PROCEDURE — 1036F TOBACCO NON-USER: CPT | Performed by: PSYCHIATRY & NEUROLOGY

## 2024-01-08 PROCEDURE — 99214 OFFICE O/P EST MOD 30 MIN: CPT | Performed by: PSYCHIATRY & NEUROLOGY

## 2024-01-08 PROCEDURE — 1159F MED LIST DOCD IN RCRD: CPT | Performed by: PSYCHIATRY & NEUROLOGY

## 2024-01-08 RX ORDER — BACLOFEN 10 MG/1
TABLET ORAL
Qty: 90 TABLET | Refills: 3 | Status: SHIPPED | OUTPATIENT
Start: 2024-01-08

## 2024-01-08 ASSESSMENT — ENCOUNTER SYMPTOMS
LOSS OF SENSATION IN FEET: 0
DEPRESSION: 0
OCCASIONAL FEELINGS OF UNSTEADINESS: 0

## 2024-01-08 ASSESSMENT — PATIENT HEALTH QUESTIONNAIRE - PHQ9
2. FEELING DOWN, DEPRESSED OR HOPELESS: NOT AT ALL
1. LITTLE INTEREST OR PLEASURE IN DOING THINGS: NOT AT ALL
SUM OF ALL RESPONSES TO PHQ9 QUESTIONS 1 AND 2: 0

## 2024-01-08 NOTE — PROGRESS NOTES
Subjective     Ismael Deras is a right handed  68 y.o. year old male who presents with FUV of MSA.  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  He is here w his sister.      Currently he is living at [facility] ([facility]   Main symptom is that he is confined to the wheelchair. He is no longer ambulatory.   He has double vision, saw ophthalmologist, was recommended glasses possibly w prisms, but did not follow up with same.   His memory is fine.  No VH  Sleep through night for 6 hours  Mood is fine,no depression,anxiety  He has somebody help w showering/bathing. He has help every 3 days, Manages to use the bathroom during the day time mostly. He transfers independently, takes him a minute or more.      Some stiffness in his neck. No new weakness, loss of   sensation, Tremor: shakiness  Endorses slowness of movement.      He continues to feel dizzy, Denies syncope.  He has urinary incontinence and uses condom catheter at night  Blood pressure: Continues on fludrocortisone 0.1mg 2 tablets in AM and 2 in afternoon.   Constipation: Does report constipation. He takes miralax and has a BM every other day   He is only drinking 20 ounces water - per day. Worried about too much fluid intake causing worsening urinary frequency.     He states that  he has bilateral inguinal hernia, was recommended to only have hernia if persistent pain etc.           Barium swallow test on 8/9/2023:Patient presents with moderate oropharyngeal dysphagia upon  completion of modified barium swallow study this date. Swallowing  impairments are detailed above and were mainly characterized by  piecemeal swallow, decreased hyolaryngeal excursion, inconsistently  reduced tongue base retraction, reduced epiglottic inversion, and  delayed laryngeal vestibule closure. Patient demonstrated penetration  and SILENT aspiration during the swallow with thin and mildly thick  liquids,  "as well as SILENT aspiration of residue from previous trials  during the regular solid trial. Patient demonstrated trace  penetration of moderately thick liquid (via spoon) and pudding. Cough  and repeat swallow was cued, but was ineffective. Chin tuck did not  prevent penetration/aspiration during thin liquid trials, however it  reduced the risk of aspiration during trials of all other  consistencies. Patient also demonstrated trace-mild oral and  pharyngeal residue across consistencies that was reduced with cued  repeat and effortful swallows. Patient further demonstrating  vallecular retention of barium tablet requiring honey thick liquid  wash to clear. Retention and retrograde flow throughout the esophagus  were observed in anterior-posterior view, consistent with patient's  known history of GERD.     Despite findings of this study, patient has not developed  aspiration-related complications on current diet; therefore,  recommend continuing baseline diet with strict aspiration precautions  and oral care. Patient does remain at risk for further worsening  dysphagia in the setting of MSA-C, and swallowing function should be  closely monitored with repeat imaging (MBSS vs. FEES) repeated as  indicated. If patient develops aspiration pneumonia, diet  modification and/or alternate means of nutrition may need to be  considered at that time.     Patient to continue swallowing exercise and RMST program previously  recommended, and was encouraged to call/schedule follow-up to review  swallowing strategies/exercises.     Prior History:  Over 5 years ago he noticed that he was unable to balance and stand on one leg, states physician \"saw something\" and ordered an MRI of the brain and C-spine.   Balance issues gradually worsened, he felt that he was bouncing into the walls when going up stairs, his shoulders would hit wall when walking,   Symptoms gradually progressed. Parkinsonism has not been levodopa responsive in past " and has worsened dizziness. On fludrocotisone for OH.      Prior work up:  EMG/NCS findings suggestive of neuropathy  MRI brain and C spine 4 years back - small vessel ischemic changes  VIt E normal  STACEY - normal, JITENDRA panel normal (including anti-SSA, anti-SSb, anti centromere, anti-Ana, anti-Scl70- see scanned)  RPR non reactive  B12 - 818, folic acid 15.1, TSH - 1.79, ceruloplasmin 17, ESR <1, Comprehensive metabolic panel -nl  CK - 164  Lyme ab - negative  Autoimmune encephalitis panel - anti-mitchell, dppx, michelle-receptors, Caspr, crmp, nimesh, PCR tr, anti-NMDAr, mGlur1,LGI1-ab, N-type Ca Ch ab, P/Q Type Ca Ch ab, PCA1 and 2, NIMESH 1,2,3, AMPA, amphysin, AGNA,      As per notes from [place] - last MRI >1 year ago showed vermian atrophy as well as ? hot cross bun in matt.  TST suggestive of central dysautonomia. Also autonomic testing suggestive (but not severely abnormal)                   Review of Systems  All other system have been reviewed and are negative for complaint.  Objective   Neurological Exam    Physical Exam  Choppy pursuit and hypometric saccades,   He has no rest tremor, mild myoclonic jerks.   There is bilateral Postural and kinetic tremor   Bilateral bradykinesia in both upper and lower extremities  +2 Rigidity in neck and upper and lower extremities  Ataxic on FNF bilaterally.   Wheelchair bound.   Leg edema in both legs.                            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 He is no longer ambulatory, but is currently still residing in an independent living facility, and he is moving to skilled nursing in February.  Last time referred for  palliative care, the benefit of same was discussed w patient and sister.   For stiffness in neck we plan start Baclofen with low dose. And titrate to 10 mg BID.    His sister stated that he has bilateral inguinal hernia and talked with surgeon and is not strangulated and surgery is not recommended at  this time. Would agree with same, only surgery if emergency, given that may be difficult to extubate patient after his surgery due to MSA. We also discussed prognosis and progression of MSA.   In addition Maxi Deras has significant mobility issues that significantly impairs his ability to do his activities of daily living . Henry mobility issues cannot be sufficiently resolved by the use of a cane or walker. His current residence provides adequate access and maneuvering space and surfaces to maneuver a manual wheelchair. A manual wheelchair will allow Mr. Deras to participate in all activities of daily living and improve the quality of his life. He will use the wheelchair for all waking hours of the day. He has a person assigned that is able to provide assistance to with maneuvering the wheelchair in the event that he can't maneuver independently. Due to lower leg edema Maxi has to have elevated leg rests on his wheelchair.       Plan:  Start Baclofen 10 mg tablet first week at night then one tablet at night and half in the morning and then 10 mg BID  Continue Fludrocortisone.  Potassium levels have remained stable.   Non motorized wheelchair with leg rests bcz of leg edema  RTC in 6 months.       I saw and evaluated the patient. I personally obtained the key and critical portions of the history and physical exam or was physically present for key and critical portions performed by the resident/fellow. I reviewed the resident/fellow's documentation and discussed the patient with the resident/fellow. I agree with the resident/fellow's medical decision making as documented in the note.    Evon Abrams MD       For the Evaluation and Management of this patient, the level of Medical Decision Making for this visit was determined based on the following:    The level of COMPLEXITY AND NUMBER OF PROBLEMS ADDRESSED was , MODERATE,  as determined by:     MODERATE:    one chronic illnesses with exacerbation, progression or  side effect of Rx.    The AMOUNT/COMPLEXITY OF DATA TO REVIEW (reviewed, ordered or call for) was  LIMITED] as determined by:    LIMITED:  my review of prior external notes from a unique source.      The level of RISK OF COMPLICATIONS was  MODERATE,] as determined by:    MODERATE:  prescription drug management.      Thus, the level of medical decision making (based on the lower of the two highest elements) was determined to be , MODERATE,]. Therefore the appropriate E/M code for this encounter is, 26761/

## 2024-01-08 NOTE — PATIENT INSTRUCTIONS
It was pleasure meet you   For stiffness we plan start medication called baclofen   First week Take one tablet at night second week take one tablet at night and half tablet in the morning and third week one tablet in the morning and one at night

## 2024-01-08 NOTE — LETTER
January 10, 2024     Bobby Sylvester MD  [address] [facility] [address]    Patient: Ismael Deras   YOB: 1955   Date of Visit: 1/8/2024       Dear Dr. Bobby Sylvester MD:    Thank you for referring Ismael Deras to me for evaluation. Below are my notes for this consultation.  If you have questions, please do not hesitate to call me. I look forward to following your patient along with you.       Sincerely,     Evon Abrams MD      CC: No Recipients  ______________________________________________________________________________________    Subjective    Ismael Deras is a right handed  68 y.o. year old male who presents with FUV of MSA.  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  He is here w his sister.      Currently he is living at [facility] ([facility]   Main symptom is that he is confined to the wheelchair. He is no longer ambulatory.   He has double vision, saw ophthalmologist, was recommended glasses possibly w prisms, but did not follow up with same.   His memory is fine.  No VH  Sleep through night for 6 hours  Mood is fine,no depression,anxiety  He has somebody help w showering/bathing. He has help every 3 days, Manages to use the bathroom during the day time mostly. He transfers independently, takes him a minute or more.      Some stiffness in his neck. No new weakness, loss of   sensation, Tremor: shakiness  Endorses slowness of movement.      He continues to feel dizzy, Denies syncope.  He has urinary incontinence and uses condom catheter at night  Blood pressure: Continues on fludrocortisone 0.1mg 2 tablets in AM and 2 in afternoon.   Constipation: Does report constipation. He takes miralax and has a BM every other day   He is only drinking 20 ounces water - per day. Worried about too much fluid intake causing worsening urinary frequency.     He states that  he has bilateral inguinal hernia,  was recommended to only have hernia if persistent pain etc.           Barium swallow test on 8/9/2023:Patient presents with moderate oropharyngeal dysphagia upon  completion of modified barium swallow study this date. Swallowing  impairments are detailed above and were mainly characterized by  piecemeal swallow, decreased hyolaryngeal excursion, inconsistently  reduced tongue base retraction, reduced epiglottic inversion, and  delayed laryngeal vestibule closure. Patient demonstrated penetration  and SILENT aspiration during the swallow with thin and mildly thick  liquids, as well as SILENT aspiration of residue from previous trials  during the regular solid trial. Patient demonstrated trace  penetration of moderately thick liquid (via spoon) and pudding. Cough  and repeat swallow was cued, but was ineffective. Chin tuck did not  prevent penetration/aspiration during thin liquid trials, however it  reduced the risk of aspiration during trials of all other  consistencies. Patient also demonstrated trace-mild oral and  pharyngeal residue across consistencies that was reduced with cued  repeat and effortful swallows. Patient further demonstrating  vallecular retention of barium tablet requiring honey thick liquid  wash to clear. Retention and retrograde flow throughout the esophagus  were observed in anterior-posterior view, consistent with patient's  known history of GERD.     Despite findings of this study, patient has not developed  aspiration-related complications on current diet; therefore,  recommend continuing baseline diet with strict aspiration precautions  and oral care. Patient does remain at risk for further worsening  dysphagia in the setting of MSA-C, and swallowing function should be  closely monitored with repeat imaging (MBSS vs. FEES) repeated as  indicated. If patient develops aspiration pneumonia, diet  modification and/or alternate means of nutrition may need to be  considered at that time.    "  Patient to continue swallowing exercise and RMST program previously  recommended, and was encouraged to call/schedule follow-up to review  swallowing strategies/exercises.     Prior History:  Over 5 years ago he noticed that he was unable to balance and stand on one leg, states physician \"saw something\" and ordered an MRI of the brain and C-spine.   Balance issues gradually worsened, he felt that he was bouncing into the walls when going up stairs, his shoulders would hit wall when walking,   Symptoms gradually progressed. Parkinsonism has not been levodopa responsive in past and has worsened dizziness. On fludrocotisone for OH.      Prior work up:  EMG/NCS findings suggestive of neuropathy  MRI brain and C spine 4 years back - small vessel ischemic changes  VIt E normal  STACEY - normal, JITENDRA panel normal (including anti-SSA, anti-SSb, anti centromere, anti-Ana, anti-Scl70- see scanned)  RPR non reactive  B12 - 818, folic acid 15.1, TSH - 1.79, ceruloplasmin 17, ESR <1, Comprehensive metabolic panel -nl  CK - 164  Lyme ab - negative  Autoimmune encephalitis panel - anti-mitchell, dppx, michelle-receptors, Caspr, crmp, nimesh, PCR tr, anti-NMDAr, mGlur1,LGI1-ab, N-type Ca Ch ab, P/Q Type Ca Ch ab, PCA1 and 2, NIMESH 1,2,3, AMPA, amphysin, AGNA,      As per notes from [place] - last MRI >1 year ago showed vermian atrophy as well as ? hot cross bun in matt.  TST suggestive of central dysautonomia. Also autonomic testing suggestive (but not severely abnormal)                   Review of Systems  All other system have been reviewed and are negative for complaint.  Objective  Neurological Exam    Physical Exam  Choppy pursuit and hypometric saccades,   He has no rest tremor, mild myoclonic jerks.   There is bilateral Postural and kinetic tremor   Bilateral bradykinesia in both upper and lower extremities  +2 Rigidity in neck and upper and lower extremities  Ataxic on FNF bilaterally.   Wheelchair bound.   Leg edema in both legs.        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 He is no longer ambulatory, but is currently still residing in an independent living facility, and he is moving to skilled nursing in February.  Last time referred for  palliative care, the benefit of same was discussed w patient and sister.   For stiffness in neck we plan start Baclofen with low dose. And titrate to 10 mg BID.    His sister stated that he has bilateral inguinal hernia and talked with surgeon and is not strangulated and surgery is not recommended at this time. Would agree with same, only surgery if emergency, given that may be difficult to extubate patient after his surgery due to MSA. We also discussed prognosis and progression of MSA.      Plan:  Start Baclofen 10 mg tablet first week at night then one tablet at night and half in the morning and then 10 mg BID  Continue Fludrocortisone.  Potassium levels have remained stable.   RTC in 6 months.       I saw and evaluated the patient. I personally obtained the key and critical portions of the history and physical exam or was physically present for key and critical portions performed by the resident/fellow. I reviewed the resident/fellow's documentation and discussed the patient with the resident/fellow. I agree with the resident/fellow's medical decision making as documented in the note.    Evon Abrams MD       For the Evaluation and Management of this patient, the level of Medical Decision Making for this visit was determined based on the following:    The level of COMPLEXITY AND NUMBER OF PROBLEMS ADDRESSED was , MODERATE,  as determined by:     MODERATE:    one chronic illnesses with exacerbation, progression or side effect of Rx.    The AMOUNT/COMPLEXITY OF DATA TO REVIEW (reviewed, ordered or call for) was  LIMITED] as determined by:    LIMITED:  my review of prior external notes from a unique source.      The level of RISK OF  COMPLICATIONS was  MODERATE,] as determined by:    MODERATE:  prescription drug management.      Thus, the level of medical decision making (based on the lower of the two highest elements) was determined to be , MODERATE,]. Therefore the appropriate E/M code for this encounter is, 76391/

## 2024-01-12 ENCOUNTER — TELEPHONE (OUTPATIENT)
Dept: PRIMARY CARE | Facility: CLINIC | Age: 69
End: 2024-01-12
Payer: MEDICARE

## 2024-01-12 DIAGNOSIS — K21.9 GASTROESOPHAGEAL REFLUX DISEASE, UNSPECIFIED WHETHER ESOPHAGITIS PRESENT: ICD-10-CM

## 2024-01-12 DIAGNOSIS — I95.1 ORTHOSTATIC HYPOTENSION: ICD-10-CM

## 2024-01-12 RX ORDER — LANSOPRAZOLE 30 MG/1
CAPSULE, DELAYED RELEASE ORAL
Qty: 180 CAPSULE | Refills: 1 | Status: SHIPPED | OUTPATIENT
Start: 2024-01-12

## 2024-01-12 RX ORDER — FLUDROCORTISONE ACETATE 0.1 MG/1
0.2 TABLET ORAL 2 TIMES DAILY
Qty: 180 TABLET | Refills: 1 | Status: SHIPPED | OUTPATIENT
Start: 2024-01-12

## 2024-01-12 NOTE — TELEPHONE ENCOUNTER
Rx Refill Request Telephone Encounter    Name:  Ismael Deras  : 1955     Medication Name:  fludrocortisone (Florinef   Dose (Optional):    0.1 mg   Quantity (Optional):    180  Directions (Optional):   take 1 tablet by mouth 2 times a day    ALLERGIES:   no known allergies     Specific Pharmacy location:  [facility]     Date of last appointment:  23  Date of next appointment:  NOT SCHEDULED     Best number to reach patient:  [phone]      Medication Name:  LANSOPRAZOLE   Dose (Optional):    30   Quantity (Optional):    180  Directions (Optional):   TAKE 1 CAPSULE BY MOUTH TWO TIMES A DAY   PHARMACY:  [facility]

## 2024-02-01 ENCOUNTER — NURSING HOME VISIT (OUTPATIENT)
Dept: POST ACUTE CARE | Facility: EXTERNAL LOCATION | Age: 69
End: 2024-02-01
Payer: MEDICARE

## 2024-02-01 DIAGNOSIS — R13.10 DYSPHAGIA, UNSPECIFIED TYPE: ICD-10-CM

## 2024-02-01 DIAGNOSIS — G11.9 CEREBELLAR ATAXIA (MULTI): ICD-10-CM

## 2024-02-01 DIAGNOSIS — K21.9 GASTROESOPHAGEAL REFLUX DISEASE, UNSPECIFIED WHETHER ESOPHAGITIS PRESENT: ICD-10-CM

## 2024-02-01 DIAGNOSIS — G90.3 MULTIPLE SYSTEM ATROPHY (MULTI): Primary | ICD-10-CM

## 2024-02-01 DIAGNOSIS — I95.1 ORTHOSTATIC HYPOTENSION: ICD-10-CM

## 2024-02-01 DIAGNOSIS — E03.9 HYPOTHYROIDISM, UNSPECIFIED TYPE: ICD-10-CM

## 2024-02-01 DIAGNOSIS — G23.8 MULTIPLE SYSTEM ATROPHY (MULTI): Primary | ICD-10-CM

## 2024-02-01 PROCEDURE — 99310 SBSQ NF CARE HIGH MDM 45: CPT | Performed by: NURSE PRACTITIONER

## 2024-02-01 NOTE — PROGRESS NOTES
Subjective   Ismael Deras is a 68 y.o. male Here for long term care admission.   HPI  He was diagnosed with multisystem atrophy and cerebellar ataxia in 2016.  He follows with movement specialist and is wheelchair bound.  He was previously residing in an assisted living but his care needs have increased  prompting admission to long term care.    He is stiff and ataxic and some degree of dysphagia but is on a regular diet.  He follows with neurology (movement specialist) and urology, he wears an external catheter at night.   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       MEDS:  Baclofen  Fludrocortisone  Furosemide  Lansoprazole (will dc, duplicate)  pantoprazole  Levothyroxine  Tamsulosin  Valacyclovir    No recent labs available, considert obtianing baseline labs in near future        Review of Systems   Constitutional:  Positive for activity change. Negative for chills, fatigue and fever.   Respiratory:  Negative for cough and shortness of breath.    Cardiovascular:  Negative for chest pain and palpitations.   Gastrointestinal:  Negative for abdominal pain, constipation, diarrhea, nausea and vomiting.   Genitourinary:  Negative for difficulty urinating.        Uses external catheter at night   Neurological:  Positive for weakness.       Objective   /84   Pulse 67   Temp 36.6 °C (97.8 °F)   Resp 18   Wt 83.5 kg (184 lb)   SpO2 98%   BMI 25.66 kg/m²     Physical Exam  Constitutional:       General: He is not in acute distress.     Comments: Bradykinesia, slow speech, sitting up in wheelchair.     HENT:      Head: Normocephalic and atraumatic.   Eyes:      Conjunctiva/sclera: Conjunctivae normal.   Cardiovascular:      Rate and Rhythm: Normal rate and regular rhythm.   Pulmonary:      Effort: Pulmonary effort is normal. No respiratory distress.      Breath sounds: Normal breath sounds.   Abdominal:      General: Bowel  sounds are normal. There is no distension.      Palpations: Abdomen is soft.      Tenderness: There is no abdominal tenderness.   Musculoskeletal:      Right lower leg: No edema.      Left lower leg: No edema.      Comments: Stiff movements, ataxia, masked facies, bradykinesia   Skin:     General: Skin is warm and dry.   Neurological:      General: No focal deficit present.      Mental Status: He is alert and oriented to person, place, and time.   Psychiatric:         Mood and Affect: Mood normal.         Behavior: Behavior normal.         Assessment/Plan   Problem List Items Addressed This Visit       Cerebellar ataxia (CMS/HCC)     Follow up with Dr Abrams as scheduled.  He requires assistance with ADL's.          Dysphagia     MBS 8/23 completed showed aspiration.  As he had not had any aspiration pna decision was made to conitnue on regular diet and monitor.  Will have speech evaluate if needed based on clinical course.          GERD (gastroesophageal reflux disease)     On PPI< states sx are controlled.          Hypothyroidism     On levothyroxine, no recent labs available, consider checking labs in the near future.          Multiple system atrophy (CMS/HCC) - Primary     Follows with Dr Abrams.    He is nonambulatory.          Orthostatic hypotension     On fludrocortisone from neurology.   Will continue dose per neurology notes.           labs/meds/orders reviewed  staff to monitor and notify for any changes.  Follow up with Dr Abrams for multisystem atrophy  Consider labs in the near future  Monitor for any sx of aspiration  Time for coordination of care was greater than 35 minutes with, visit and exam, discussion of treatment plan with patient and also discussion of case with staff.

## 2024-02-01 NOTE — LETTER
Patient: Ismael Deras  : 1955    Encounter Date: 2024    Subjective  Ismael Deras is a 68 y.o. male Here for long term care admission.   HPI  He was diagnosed with multisystem atrophy and cerebellar ataxia in 2016.  He follows with movement specialist and is wheelchair bound.  He was previously residing in an assisted living but his care needs have increased  prompting admission to long term care.    He is stiff and ataxic and some degree of dysphagia but is on a regular diet.  He follows with neurology (movement specialist) and urology, he wears an external catheter at night.   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       MEDS:  Baclofen  Fludrocortisone  Furosemide  Lansoprazole (will dc, duplicate)  pantoprazole  Levothyroxine  Tamsulosin  Valacyclovir    No recent labs available, considert obtianing baseline labs in near future        Review of Systems   Constitutional:  Positive for activity change. Negative for chills, fatigue and fever.   Respiratory:  Negative for cough and shortness of breath.    Cardiovascular:  Negative for chest pain and palpitations.   Gastrointestinal:  Negative for abdominal pain, constipation, diarrhea, nausea and vomiting.   Genitourinary:  Negative for difficulty urinating.        Uses external catheter at night   Neurological:  Positive for weakness.       Objective  /84   Pulse 67   Temp 36.6 °C (97.8 °F)   Resp 18   Wt 83.5 kg (184 lb)   SpO2 98%   BMI 25.66 kg/m²     Physical Exam  Constitutional:       General: He is not in acute distress.     Comments: Bradykinesia, slow speech, sitting up in wheelchair.     HENT:      Head: Normocephalic and atraumatic.   Eyes:      Conjunctiva/sclera: Conjunctivae normal.   Cardiovascular:      Rate and Rhythm: Normal rate and regular rhythm.   Pulmonary:      Effort: Pulmonary effort is normal. No respiratory distress.      Breath  sounds: Normal breath sounds.   Abdominal:      General: Bowel sounds are normal. There is no distension.      Palpations: Abdomen is soft.      Tenderness: There is no abdominal tenderness.   Musculoskeletal:      Right lower leg: No edema.      Left lower leg: No edema.      Comments: Stiff movements, ataxia, masked facies, bradykinesia   Skin:     General: Skin is warm and dry.   Neurological:      General: No focal deficit present.      Mental Status: He is alert and oriented to person, place, and time.   Psychiatric:         Mood and Affect: Mood normal.         Behavior: Behavior normal.         Assessment/Plan  Problem List Items Addressed This Visit       Cerebellar ataxia (CMS/HCC)     Follow up with Dr Abrams as scheduled.  He requires assistance with ADL's.          Dysphagia     MBS 8/23 completed showed aspiration.  As he had not had any aspiration pna decision was made to conitnue on regular diet and monitor.  Will have speech evaluate if needed based on clinical course.          GERD (gastroesophageal reflux disease)     On PPI< states sx are controlled.          Hypothyroidism     On levothyroxine, no recent labs available, consider checking labs in the near future.          Multiple system atrophy (CMS/HCC) - Primary     Follows with Dr Abrams.    He is nonambulatory.          Orthostatic hypotension     On fludrocortisone from neurology.   Will continue dose per neurology notes.           labs/meds/orders reviewed  staff to monitor and notify for any changes.  Follow up with Dr Abrams for multisystem atrophy  Consider labs in the near future  Monitor for any sx of aspiration  Time for coordination of care was greater than 35 minutes with, visit and exam, discussion of treatment plan with patient and also discussion of case with staff.      Electronically Signed By: ANDREW Mckeon   2/6/24  9:58 PM

## 2024-02-02 ENCOUNTER — NURSING HOME VISIT (OUTPATIENT)
Dept: POST ACUTE CARE | Facility: EXTERNAL LOCATION | Age: 69
End: 2024-02-02
Payer: MEDICARE

## 2024-02-02 VITALS
RESPIRATION RATE: 18 BRPM | TEMPERATURE: 98 F | SYSTOLIC BLOOD PRESSURE: 128 MMHG | DIASTOLIC BLOOD PRESSURE: 80 MMHG | OXYGEN SATURATION: 95 % | WEIGHT: 184 LBS | BODY MASS INDEX: 25.66 KG/M2 | HEART RATE: 72 BPM

## 2024-02-02 DIAGNOSIS — I87.2 VENOUS INSUFFICIENCY: ICD-10-CM

## 2024-02-02 DIAGNOSIS — R47.1 ATAXIC DYSARTHRIA: ICD-10-CM

## 2024-02-02 DIAGNOSIS — G90.3 MULTIPLE SYSTEM ATROPHY (MULTI): Primary | ICD-10-CM

## 2024-02-02 DIAGNOSIS — G11.9 CEREBELLAR ATAXIA (MULTI): ICD-10-CM

## 2024-02-02 DIAGNOSIS — R26.89 BALANCE PROBLEM: ICD-10-CM

## 2024-02-02 DIAGNOSIS — I95.1 ORTHOSTATIC HYPOTENSION: ICD-10-CM

## 2024-02-02 DIAGNOSIS — G23.8 MULTIPLE SYSTEM ATROPHY (MULTI): Primary | ICD-10-CM

## 2024-02-02 DIAGNOSIS — R26.81 UNSTEADY GAIT: ICD-10-CM

## 2024-02-02 DIAGNOSIS — E03.9 HYPOTHYROIDISM, UNSPECIFIED TYPE: ICD-10-CM

## 2024-02-02 DIAGNOSIS — R29.6 REPEATED FALLS: ICD-10-CM

## 2024-02-02 PROCEDURE — 99306 1ST NF CARE HIGH MDM 50: CPT | Performed by: INTERNAL MEDICINE

## 2024-02-02 NOTE — PROGRESS NOTES
Subjective   Patient ID: Ismael Deras is a 68 y.o. male who is long term resident being seen and evaluated for multiple medical problems.    HPI   This is a 68-year-old male patient who presents from an assisted living situation to begin to live here at the extended care facility long-term.  The patient tells me that he has combination of cerebellar ataxia and multisystem atrophy being followed by the movement disorder specialist Dr. Sommer Abrams.  The patient tells me that his degeneration has been rather rapid as he first noticed a balance problem 6 years ago and now is essentially dependent.  The patient reports ongoing issues of leg edema.  He is currently taking higher dose Florinef and furosemide.  He uses an external condom catheter daily to control his urine collection.  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    Laboratory examination from July 2023:  Potassium 4.1  Bicarbonate 32  Creatinine 0.94  GFR 87  Albumin 3.9  Laboratory examination from April 2023:  Hemoglobin 13.3  Liver injury test normal  HDL 65  Cholesterol 158  PSA 1.50    MEDS:  Baclofen  Fludrocortisone  Furosemide  Lansoprazole (will dc, duplicate)  pantoprazole  Levothyroxine  Tamsulosin  Valacyclovir    Review of Systems   Constitutional:  Negative for chills, diaphoresis and fever.   Respiratory:  Negative for cough and shortness of breath.    Cardiovascular:  Positive for leg swelling. Negative for chest pain.   Gastrointestinal:  Negative for constipation, diarrhea, nausea and vomiting.   Musculoskeletal:  Negative for joint swelling and myalgias.       Objective   /80   Pulse 72   Temp 36.7 °C (98 °F)   Resp 18   Wt 83.5 kg (184 lb)   SpO2 95%   BMI 25.66 kg/m²     Physical Exam  Vitals reviewed.   Constitutional:       General: He is not in acute distress.     Appearance: He is not ill-appearing.   Cardiovascular:      Rate and Rhythm: Normal rate and regular rhythm.      Pulses: Normal  pulses.      Heart sounds:      No gallop.   Pulmonary:      Breath sounds: Normal breath sounds. No wheezing, rhonchi or rales.   Abdominal:      General: Abdomen is flat. Bowel sounds are normal.      Palpations: Abdomen is soft.      Tenderness: There is no guarding or rebound.   Musculoskeletal:      Right lower leg: Edema present.      Left lower leg: Edema present.   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  We will continue with restorative and supportive care as the patient tolerates    B.  This point we will decrease Florinef to 0.1 mg daily and continue with furosemide 40 mg daily.  If he does not manifest any ongoing issues of orthostasis then we will consider discontinuing Florinef altogether in an effort to relieve him of his symptomatic lower extremity edema.  If in the discontinuation of Florinef his orthostatic hypotension returns then we may consider trial of ProAmatine if there is no contraindication from his movement disorder specialist.  It is somewhat unusual to be on fludrocortisone without potassium replacement especially while being treated with a loop diuretic.  Due to this laboratory examinations will be checked specifically for electrolytes and renal function.    C.  The patient's disposition will likely remain here for long-term care    D.  Laboratory examinations will continue to be monitored on an ongoing as-needed basis    8.  The patient's prognosis is [id number].  I suspect that multiple system atrophy will  be unfortunately a slowly progressive neurological disorder for this patient.

## 2024-02-02 NOTE — LETTER
Patient: Ismael Deras  : 1955    Encounter Date: 2024    Subjective  Patient ID: Ismael Dears is a 68 y.o. male who is long term resident being seen and evaluated for multiple medical problems.    HPI   This is a 68-year-old male patient who presents from an assisted living situation to begin to live here at the extended care facility long-term.  The patient tells me that he has combination of cerebellar ataxia and multisystem atrophy being followed by the movement disorder specialist Dr. Smomer Abrams.  The patient tells me that his degeneration has been rather rapid as he first noticed a balance problem 6 years ago and now is essentially dependent.  The patient reports ongoing issues of leg edema.  He is currently taking higher dose Florinef and furosemide.  He uses an external condom catheter daily to control his urine collection.  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    Laboratory examination from 2023:  Potassium 4.1  Bicarbonate 32  Creatinine 0.94  GFR 87  Albumin 3.9  Laboratory examination from 2023:  Hemoglobin 13.3  Liver injury test normal  HDL 65  Cholesterol 158  PSA 1.50    MEDS:  Baclofen  Fludrocortisone  Furosemide  Lansoprazole (will dc, duplicate)  pantoprazole  Levothyroxine  Tamsulosin  Valacyclovir    Review of Systems   Constitutional:  Negative for chills, diaphoresis and fever.   Respiratory:  Negative for cough and shortness of breath.    Cardiovascular:  Positive for leg swelling. Negative for chest pain.   Gastrointestinal:  Negative for constipation, diarrhea, nausea and vomiting.   Musculoskeletal:  Negative for joint swelling and myalgias.       Objective  /80   Pulse 72   Temp 36.7 °C (98 °F)   Resp 18   Wt 83.5 kg (184 lb)   SpO2 95%   BMI 25.66 kg/m²     Physical Exam  Vitals reviewed.   Constitutional:       General: He is not in acute distress.     Appearance: He is not ill-appearing.   Cardiovascular:       Rate and Rhythm: Normal rate and regular rhythm.      Pulses: Normal pulses.      Heart sounds:      No gallop.   Pulmonary:      Breath sounds: Normal breath sounds. No wheezing, rhonchi or rales.   Abdominal:      General: Abdomen is flat. Bowel sounds are normal.      Palpations: Abdomen is soft.      Tenderness: There is no guarding or rebound.   Musculoskeletal:      Right lower leg: Edema present.      Left lower leg: Edema present.   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  We will continue with restorative and supportive care as the patient tolerates    B.  This point we will decrease Florinef to 0.1 mg daily and continue with furosemide 40 mg daily.  If he does not manifest any ongoing issues of orthostasis then we will consider discontinuing Florinef altogether in an effort to relieve him of his symptomatic lower extremity edema.  If in the discontinuation of Florinef his orthostatic hypotension returns then we may consider trial of ProAmatine if there is no contraindication from his movement disorder specialist.  It is somewhat unusual to be on fludrocortisone without potassium replacement especially while being treated with a loop diuretic.  Due to this laboratory examinations will be checked specifically for electrolytes and renal function.    C.  The patient's disposition will likely remain here for long-term care    D.  Laboratory examinations will continue to be monitored on an ongoing as-needed basis    8.  The patient's  prognosis is [id number].  I suspect that multiple system atrophy will be unfortunately a slowly progressive neurological disorder for this patient.      Electronically Signed By: Tucker Manrique MD   2/5/24  5:57 PM

## 2024-02-05 ASSESSMENT — ENCOUNTER SYMPTOMS
NAUSEA: 0
MYALGIAS: 0
JOINT SWELLING: 0
SHORTNESS OF BREATH: 0
DIAPHORESIS: 0
VOMITING: 0
CHILLS: 0
FEVER: 0
DIARRHEA: 0
CONSTIPATION: 0
COUGH: 0

## 2024-02-06 VITALS
SYSTOLIC BLOOD PRESSURE: 130 MMHG | HEART RATE: 67 BPM | RESPIRATION RATE: 18 BRPM | TEMPERATURE: 97.8 F | DIASTOLIC BLOOD PRESSURE: 84 MMHG | OXYGEN SATURATION: 98 % | BODY MASS INDEX: 25.66 KG/M2 | WEIGHT: 184 LBS

## 2024-02-06 ASSESSMENT — ENCOUNTER SYMPTOMS
CONSTIPATION: 0
FEVER: 0
DIFFICULTY URINATING: 0
ACTIVITY CHANGE: 1
COUGH: 0
NAUSEA: 0
DIARRHEA: 0
VOMITING: 0
WEAKNESS: 1
SHORTNESS OF BREATH: 0
CHILLS: 0
FATIGUE: 0
ABDOMINAL PAIN: 0
PALPITATIONS: 0

## 2024-02-07 NOTE — ASSESSMENT & PLAN NOTE
MBS 8/23 completed showed aspiration.  As he had not had any aspiration pna decision was made to conitnue on regular diet and monitor.  Will have speech evaluate if needed based on clinical course.

## 2024-02-09 ENCOUNTER — NURSING HOME VISIT (OUTPATIENT)
Dept: POST ACUTE CARE | Facility: EXTERNAL LOCATION | Age: 69
End: 2024-02-09
Payer: MEDICARE

## 2024-02-09 VITALS
TEMPERATURE: 98.2 F | BODY MASS INDEX: 25.66 KG/M2 | DIASTOLIC BLOOD PRESSURE: 68 MMHG | OXYGEN SATURATION: 96 % | HEART RATE: 71 BPM | RESPIRATION RATE: 18 BRPM | WEIGHT: 184 LBS | SYSTOLIC BLOOD PRESSURE: 114 MMHG

## 2024-02-09 DIAGNOSIS — I95.1 ORTHOSTATIC HYPOTENSION: Primary | ICD-10-CM

## 2024-02-09 DIAGNOSIS — R26.81 UNSTEADY GAIT: ICD-10-CM

## 2024-02-09 DIAGNOSIS — R29.6 REPEATED FALLS: ICD-10-CM

## 2024-02-09 DIAGNOSIS — I95.1 AUTONOMIC ORTHOSTATIC HYPOTENSION: ICD-10-CM

## 2024-02-09 DIAGNOSIS — G62.9 POLYNEUROPATHY: ICD-10-CM

## 2024-02-09 DIAGNOSIS — G90.3 MULTIPLE SYSTEM ATROPHY (MULTI): ICD-10-CM

## 2024-02-09 DIAGNOSIS — G11.9 CEREBELLAR ATAXIA (MULTI): ICD-10-CM

## 2024-02-09 DIAGNOSIS — G23.8 MULTIPLE SYSTEM ATROPHY (MULTI): ICD-10-CM

## 2024-02-09 PROCEDURE — 99309 SBSQ NF CARE MODERATE MDM 30: CPT | Performed by: INTERNAL MEDICINE

## 2024-02-09 NOTE — LETTER
Patient: Ismael Deras  : 1955    Encounter Date: 2024    Subjective  Patient ID: Ismael Deras is a 68 y.o. male who is long term resident being seen and evaluated for multiple medical problems.    HPI   This 68-year-old male patient is up in his wheelchair in his room in no distress.  The patient admits that he gets a little lightheaded from time to time.  He reports that the edema in his feet and legs have improved with reduction in Florinef.  He has no shortness of breath and no pain at this time.    Current high risk medication:  Baclofen  Tamsulosin  Valacyclovir  Florinef    Renal laboratory examination on the chart for review at this time.    Review of Systems   Constitutional:  Negative for chills, diaphoresis and fever.   Respiratory:  Negative for cough and shortness of breath.    Cardiovascular:  Positive for leg swelling. Negative for chest pain.   Gastrointestinal:  Negative for constipation, diarrhea, nausea and vomiting.   Musculoskeletal:  Negative for joint swelling and myalgias.       Objective  /68   Pulse 71   Temp 36.8 °C (98.2 °F)   Resp 18   Wt 83.5 kg (184 lb)   SpO2 96%   BMI 25.66 kg/m²     Physical Exam  Vitals reviewed.   Constitutional:       General: He is not in acute distress.     Appearance: He is not ill-appearing.   Cardiovascular:      Rate and Rhythm: Normal rate and regular rhythm.      Pulses: Normal pulses.      Heart sounds:      No gallop.   Pulmonary:      Breath sounds: Normal breath sounds. No wheezing, rhonchi or rales.   Abdominal:      General: Abdomen is flat. Bowel sounds are normal.      Palpations: Abdomen is soft.      Tenderness: There is no guarding or rebound.   Musculoskeletal:      Right lower leg: No edema.      Left lower leg: No edema.   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  The patient is noted to have very mild but  obvious truncal ataxia         Assessment/Plan  Problem List Items Addressed This Visit             ICD-10-CM    Cerebellar ataxia (CMS/HCC) G11.9    Unsteady gait R26.81    Multiple system atrophy (CMS/HCC) G90.3    Orthostatic hypotension - Primary I95.1    Polyneuropathy G62.9    Repeated falls R29.6    Autonomic orthostatic hypotension I95.1       A.  We will continue with restorative and supportive care as the patient tolerates    B.  Will check the patient's orthostatic blood pressures and if positive discontinue Florinef and consider ProAmatine    C.  The patient's noted mild truncal ataxia is a physical sign of cerebellar degeneration.    D.  The patient's prognosis is poor.      Electronically Signed By: Tucker Manrique MD   2/12/24  5:47 PM

## 2024-02-09 NOTE — PROGRESS NOTES
Subjective   Patient ID: Ismael Deras is a 68 y.o. male who is long term resident being seen and evaluated for multiple medical problems.    HPI   This 68-year-old male patient is up in his wheelchair in his room in no distress.  The patient admits that he gets a little lightheaded from time to time.  He reports that the edema in his feet and legs have improved with reduction in Florinef.  He has no shortness of breath and no pain at this time.    Current high risk medication:  Baclofen  Tamsulosin  Valacyclovir  Florinef    Renal laboratory examination on the chart for review at this time.    Review of Systems   Constitutional:  Negative for chills, diaphoresis and fever.   Respiratory:  Negative for cough and shortness of breath.    Cardiovascular:  Positive for leg swelling. Negative for chest pain.   Gastrointestinal:  Negative for constipation, diarrhea, nausea and vomiting.   Musculoskeletal:  Negative for joint swelling and myalgias.       Objective   /68   Pulse 71   Temp 36.8 °C (98.2 °F)   Resp 18   Wt 83.5 kg (184 lb)   SpO2 96%   BMI 25.66 kg/m²     Physical Exam  Vitals reviewed.   Constitutional:       General: He is not in acute distress.     Appearance: He is not ill-appearing.   Cardiovascular:      Rate and Rhythm: Normal rate and regular rhythm.      Pulses: Normal pulses.      Heart sounds:      No gallop.   Pulmonary:      Breath sounds: Normal breath sounds. No wheezing, rhonchi or rales.   Abdominal:      General: Abdomen is flat. Bowel sounds are normal.      Palpations: Abdomen is soft.      Tenderness: There is no guarding or rebound.   Musculoskeletal:      Right lower leg: No edema.      Left lower leg: No edema.   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  The patient is noted to have very mild but obvious truncal ataxia         Assessment/Plan   Problem List Items  Addressed This Visit             ICD-10-CM    Cerebellar ataxia (CMS/HCC) G11.9    Unsteady gait R26.81    Multiple system atrophy (CMS/HCC) G90.3    Orthostatic hypotension - Primary I95.1    Polyneuropathy G62.9    Repeated falls R29.6    Autonomic orthostatic hypotension I95.1       A.  We will continue with restorative and supportive care as the patient tolerates    B.  Will check the patient's orthostatic blood pressures and if positive discontinue Florinef and consider ProAmatine    C.  The patient's noted mild truncal ataxia is a physical sign of cerebellar degeneration.    D.  The patient's prognosis is poor.

## 2024-02-12 PROBLEM — I95.1 AUTONOMIC ORTHOSTATIC HYPOTENSION: Status: ACTIVE | Noted: 2024-02-12

## 2024-02-12 ASSESSMENT — ENCOUNTER SYMPTOMS
JOINT SWELLING: 0
CHILLS: 0
COUGH: 0
MYALGIAS: 0
SHORTNESS OF BREATH: 0
CONSTIPATION: 0
FEVER: 0
VOMITING: 0
NAUSEA: 0
DIAPHORESIS: 0
DIARRHEA: 0

## 2024-03-08 ENCOUNTER — NURSING HOME VISIT (OUTPATIENT)
Dept: POST ACUTE CARE | Facility: EXTERNAL LOCATION | Age: 69
End: 2024-03-08
Payer: MEDICARE

## 2024-03-08 VITALS
RESPIRATION RATE: 18 BRPM | DIASTOLIC BLOOD PRESSURE: 68 MMHG | HEART RATE: 79 BPM | SYSTOLIC BLOOD PRESSURE: 127 MMHG | TEMPERATURE: 97.8 F | WEIGHT: 185 LBS | BODY MASS INDEX: 25.8 KG/M2 | OXYGEN SATURATION: 96 %

## 2024-03-08 DIAGNOSIS — I95.1 AUTONOMIC ORTHOSTATIC HYPOTENSION: ICD-10-CM

## 2024-03-08 DIAGNOSIS — G90.3 MULTIPLE SYSTEM ATROPHY (MULTI): ICD-10-CM

## 2024-03-08 DIAGNOSIS — I87.2 VENOUS INSUFFICIENCY: Primary | ICD-10-CM

## 2024-03-08 DIAGNOSIS — G20.C PARKINSONISM, UNSPECIFIED PARKINSONISM TYPE (MULTI): ICD-10-CM

## 2024-03-08 DIAGNOSIS — G11.9 CEREBELLAR ATAXIA (MULTI): ICD-10-CM

## 2024-03-08 DIAGNOSIS — G23.8 MULTIPLE SYSTEM ATROPHY (MULTI): ICD-10-CM

## 2024-03-08 PROCEDURE — 99307 SBSQ NF CARE SF MDM 10: CPT | Performed by: INTERNAL MEDICINE

## 2024-03-08 NOTE — PROGRESS NOTES
Subjective   Patient ID: Ismael Deras is a 68 y.o. male who is long term resident being seen and evaluated for multiple medical problems.    HPI   68-year-old male patient per nursing he is doing quite well.  He continues to have mild edema of his legs.  Otherwise nursing has no new adverse events reported to me.  I returned to the patient's room 5 times over the course of 45 minutes and the patient was persistently in the bathroom.  This is a not uncommon scenario for this patient according to nursing.    Current high risk medication:  Baclofen  Tamsulosin  Valacyclovir  Florinef    Current laboratory examination from February 2024:  Potassium 3.7  Bicarbonate 30  Creatinine 0.9  Albumin 3.0  Liver injury test normal  Hemoglobin 13.8  Vitamin B12 TSH normal  Vitamin D 26    Review of Systems   Reason unable to perform ROS: Persistently in the bathroom.       Objective   /68   Pulse 79   Temp 36.6 °C (97.8 °F)   Resp 18   Wt 83.9 kg (185 lb)   SpO2 96%   BMI 25.80 kg/m²     Physical Exam  Physical exam deferred due to the patient persistently being in the bathroom despite 5 visits over 45 minutes.    Assessment/Plan   Problem List Items Addressed This Visit             ICD-10-CM    Cerebellar ataxia (CMS/HCC) G11.9    Multiple system atrophy (CMS/HCC) G90.3    Parkinsonism G20.C    Venous insufficiency - Primary I87.2    Autonomic orthostatic hypotension I95.1       8.  We will continue with rehabilitative restorative and supportive care as the patient tolerates    B.  Laboratory examinations will next be due in August 2024 as needed    C.  Regarding the patient's ongoing leg edema from presumptive venous insufficiency it appears that his blood pressures remained stable.  If his leg edema does not continue to resolve then consideration for discontinuation of fludrocortisone altogether and monitoring of blood pressure carefully can be undertaken.    D.  The patient's prognosis is guarded.

## 2024-03-08 NOTE — LETTER
Patient: Ismael Deras  : 1955    Encounter Date: 2024    Subjective  Patient ID: Ismael Deras is a 68 y.o. male who is long term resident being seen and evaluated for multiple medical problems.    HPI   68-year-old male patient per nursing he is doing quite well.  He continues to have mild edema of his legs.  Otherwise nursing has no new adverse events reported to me.  I returned to the patient's room 5 times over the course of 45 minutes and the patient was persistently in the bathroom.  This is a not uncommon scenario for this patient according to nursing.    Current high risk medication:  Baclofen  Tamsulosin  Valacyclovir  Florinef    Current laboratory examination from 2024:  Potassium 3.7  Bicarbonate 30  Creatinine 0.9  Albumin 3.0  Liver injury test normal  Hemoglobin 13.8  Vitamin B12 TSH normal  Vitamin D 26    Review of Systems   Reason unable to perform ROS: Persistently in the bathroom.       Objective  /68   Pulse 79   Temp 36.6 °C (97.8 °F)   Resp 18   Wt 83.9 kg (185 lb)   SpO2 96%   BMI 25.80 kg/m²     Physical Exam  Physical exam deferred due to the patient persistently being in the bathroom despite 5 visits over 45 minutes.    Assessment/Plan  Problem List Items Addressed This Visit             ICD-10-CM    Cerebellar ataxia (CMS/HCC) G11.9    Multiple system atrophy (CMS/HCC) G90.3    Parkinsonism G20.C    Venous insufficiency - Primary I87.2    Autonomic orthostatic hypotension I95.1       8.  We will continue with rehabilitative restorative and supportive care as the patient tolerates    B.  Laboratory examinations will next be due in 2024 as needed    C.  Regarding the patient's ongoing leg edema from presumptive venous insufficiency it appears that his blood pressures remained stable.  If his leg edema does not continue to resolve then consideration for discontinuation of fludrocortisone altogether and monitoring of blood pressure carefully can be  undertaken.    D.  The patient's prognosis is guarded.      Electronically Signed By: Tucker Manrique MD   3/12/24 10:52 AM

## 2024-03-19 ENCOUNTER — NURSING HOME VISIT (OUTPATIENT)
Dept: POST ACUTE CARE | Facility: EXTERNAL LOCATION | Age: 69
End: 2024-03-19
Payer: MEDICARE

## 2024-03-19 VITALS
SYSTOLIC BLOOD PRESSURE: 127 MMHG | BODY MASS INDEX: 25.8 KG/M2 | RESPIRATION RATE: 18 BRPM | DIASTOLIC BLOOD PRESSURE: 68 MMHG | HEART RATE: 79 BPM | TEMPERATURE: 98.5 F | WEIGHT: 185 LBS | OXYGEN SATURATION: 97 %

## 2024-03-19 DIAGNOSIS — R53.1 WEAKNESS GENERALIZED: ICD-10-CM

## 2024-03-19 DIAGNOSIS — I95.1 AUTONOMIC ORTHOSTATIC HYPOTENSION: Primary | ICD-10-CM

## 2024-03-19 DIAGNOSIS — E03.9 HYPOTHYROIDISM, UNSPECIFIED TYPE: ICD-10-CM

## 2024-03-19 DIAGNOSIS — G23.8 MULTIPLE SYSTEM ATROPHY (MULTI): ICD-10-CM

## 2024-03-19 DIAGNOSIS — G90.3 MULTIPLE SYSTEM ATROPHY (MULTI): ICD-10-CM

## 2024-03-19 DIAGNOSIS — G11.9 CEREBELLAR ATAXIA (MULTI): ICD-10-CM

## 2024-03-19 PROCEDURE — 99309 SBSQ NF CARE MODERATE MDM 30: CPT | Performed by: NURSE PRACTITIONER

## 2024-03-19 ASSESSMENT — ENCOUNTER SYMPTOMS
SHORTNESS OF BREATH: 0
COUGH: 0
ACTIVITY CHANGE: 1
NAUSEA: 0
DIARRHEA: 0
FATIGUE: 0
CHILLS: 0
FEVER: 0
ABDOMINAL PAIN: 0
VOMITING: 0
WEAKNESS: 1
PALPITATIONS: 0
CONSTIPATION: 0
DIFFICULTY URINATING: 0

## 2024-03-19 NOTE — PROGRESS NOTES
Subjective   Ismael Deras is a 68 y.o. male Here for routine monthly visit  HPI    There are no new problems and multiple health problems have been reviewed.  The course has been unchanged.  The patient  is alert and oriented. .   There are no family members present during time of visit.    He is sitting up in chair , denies any complaints when asked.  Eating and drinking well.   No concerns per staff.          Review of Systems   Constitutional:  Positive for activity change. Negative for chills, fatigue and fever.   Respiratory:  Negative for cough and shortness of breath.    Cardiovascular:  Negative for chest pain and palpitations.   Gastrointestinal:  Negative for abdominal pain, constipation, diarrhea, nausea and vomiting.   Genitourinary:  Negative for difficulty urinating.        Uses external catheter at night   Neurological:  Positive for weakness.       Objective   /68   Pulse 79   Temp 36.9 °C (98.5 °F)   Resp 18   Wt 83.9 kg (185 lb)   SpO2 97%   BMI 25.80 kg/m²     Physical Exam  Constitutional:       General: He is not in acute distress.     Comments: Bradykinesia, slow speech, sitting up in wheelchair.     HENT:      Head: Normocephalic and atraumatic.   Eyes:      Conjunctiva/sclera: Conjunctivae normal.   Cardiovascular:      Rate and Rhythm: Normal rate and regular rhythm.   Pulmonary:      Effort: Pulmonary effort is normal. No respiratory distress.      Breath sounds: Normal breath sounds.   Abdominal:      General: Bowel sounds are normal. There is no distension.      Palpations: Abdomen is soft.      Tenderness: There is no abdominal tenderness.   Musculoskeletal:      Right lower leg: Edema (mild, 1+) present.      Left lower leg: Edema (mild, 1+) present.      Comments: Stiff movements, ataxia, masked facies, bradykinesia   Skin:     General: Skin is warm and dry.   Neurological:      General: No focal deficit present.      Mental Status: He is alert and oriented to person,  place, and time.   Psychiatric:         Mood and Affect: Mood normal.         Behavior: Behavior normal.         Assessment/Plan   Problem List Items Addressed This Visit       Cerebellar ataxia (CMS/HCC)     Follow up with Dr Abrams as scheduled.  He requires assistance with ADL's.          Hypothyroidism     On levothyroxine, monitor with routine labs.  TSH normal 2/24         Multiple system atrophy (CMS/HCC)     Follows with Dr Abrams.    He is nonambulatory.          Weakness generalized     Requires assistance with ADL's.           Autonomic orthostatic hypotension - Primary     Overall controlled on florinef.  Continue to monitor and adjust meds based on clinical course.            labs/meds/orders reviewed  staff to monitor and notify for any changes.  Follow up with Dr Abrams for multisystem atrophy  Next labs 8/24 and prn  Monitor for any sx of aspiration

## 2024-03-19 NOTE — LETTER
Patient: Ismael Deras  : 1955    Encounter Date: 2024    Subjective  Ismael Deras is a 68 y.o. male Here for routine monthly visit  HPI    There are no new problems and multiple health problems have been reviewed.  The course has been unchanged.  The patient  is alert and oriented. .   There are no family members present during time of visit.    He is sitting up in chair , denies any complaints when asked.  Eating and drinking well.   No concerns per staff.          Review of Systems   Constitutional:  Positive for activity change. Negative for chills, fatigue and fever.   Respiratory:  Negative for cough and shortness of breath.    Cardiovascular:  Negative for chest pain and palpitations.   Gastrointestinal:  Negative for abdominal pain, constipation, diarrhea, nausea and vomiting.   Genitourinary:  Negative for difficulty urinating.        Uses external catheter at night   Neurological:  Positive for weakness.       Objective  /68   Pulse 79   Temp 36.9 °C (98.5 °F)   Resp 18   Wt 83.9 kg (185 lb)   SpO2 97%   BMI 25.80 kg/m²     Physical Exam  Constitutional:       General: He is not in acute distress.     Comments: Bradykinesia, slow speech, sitting up in wheelchair.     HENT:      Head: Normocephalic and atraumatic.   Eyes:      Conjunctiva/sclera: Conjunctivae normal.   Cardiovascular:      Rate and Rhythm: Normal rate and regular rhythm.   Pulmonary:      Effort: Pulmonary effort is normal. No respiratory distress.      Breath sounds: Normal breath sounds.   Abdominal:      General: Bowel sounds are normal. There is no distension.      Palpations: Abdomen is soft.      Tenderness: There is no abdominal tenderness.   Musculoskeletal:      Right lower leg: Edema (mild, 1+) present.      Left lower leg: Edema (mild, 1+) present.      Comments: Stiff movements, ataxia, masked facies, bradykinesia   Skin:     General: Skin is warm and dry.   Neurological:      General: No focal deficit  present.      Mental Status: He is alert and oriented to person, place, and time.   Psychiatric:         Mood and Affect: Mood normal.         Behavior: Behavior normal.         Assessment/Plan  Problem List Items Addressed This Visit       Cerebellar ataxia (CMS/HCC)     Follow up with Dr Abrams as scheduled.  He requires assistance with ADL's.          Hypothyroidism     On levothyroxine, monitor with routine labs.  TSH normal 2/24         Multiple system atrophy (CMS/HCC)     Follows with Dr Abrams.    He is nonambulatory.          Weakness generalized     Requires assistance with ADL's.           Autonomic orthostatic hypotension - Primary     Overall controlled on florinef.  Continue to monitor and adjust meds based on clinical course.            labs/meds/orders reviewed  staff to monitor and notify for any changes.  Follow up with Dr Abrams for multisystem atrophy  Next labs 8/24 and prn  Monitor for any sx of aspiration        Electronically Signed By: YAHIR Mckeon-MARVIN   3/19/24  5:23 PM

## 2024-04-12 ENCOUNTER — NURSING HOME VISIT (OUTPATIENT)
Dept: POST ACUTE CARE | Facility: EXTERNAL LOCATION | Age: 69
End: 2024-04-12
Payer: MEDICARE

## 2024-04-12 VITALS
WEIGHT: 185 LBS | DIASTOLIC BLOOD PRESSURE: 68 MMHG | SYSTOLIC BLOOD PRESSURE: 127 MMHG | TEMPERATURE: 97.8 F | OXYGEN SATURATION: 97 % | RESPIRATION RATE: 18 BRPM | BODY MASS INDEX: 25.8 KG/M2 | HEART RATE: 79 BPM

## 2024-04-12 DIAGNOSIS — I87.2 VENOUS INSUFFICIENCY: ICD-10-CM

## 2024-04-12 DIAGNOSIS — G20.C PARKINSONISM, UNSPECIFIED PARKINSONISM TYPE (MULTI): ICD-10-CM

## 2024-04-12 DIAGNOSIS — G23.8 MULTIPLE SYSTEM ATROPHY (MULTI): Primary | ICD-10-CM

## 2024-04-12 DIAGNOSIS — N40.0 BENIGN PROSTATIC HYPERPLASIA, UNSPECIFIED WHETHER LOWER URINARY TRACT SYMPTOMS PRESENT: ICD-10-CM

## 2024-04-12 DIAGNOSIS — R53.1 WEAKNESS GENERALIZED: ICD-10-CM

## 2024-04-12 DIAGNOSIS — I95.1 AUTONOMIC ORTHOSTATIC HYPOTENSION: ICD-10-CM

## 2024-04-12 DIAGNOSIS — G90.3 MULTIPLE SYSTEM ATROPHY (MULTI): Primary | ICD-10-CM

## 2024-04-12 DIAGNOSIS — G11.9 CEREBELLAR ATAXIA (MULTI): ICD-10-CM

## 2024-04-12 PROCEDURE — 99308 SBSQ NF CARE LOW MDM 20: CPT | Performed by: INTERNAL MEDICINE

## 2024-04-12 NOTE — LETTER
Patient: Ismael Deras  : 1955    Encounter Date: 2024    Subjective  Patient ID: Ismael Deras is a 68 y.o. male who is long term resident being seen and evaluated for multiple medical problems.    HPI   68-year-old male patient is resting comfortably in his room being visited by a friend.  He has no complaints of pain or shortness of breath to me.  He reports that his current antacid regimen is as excellent and is controlling his symptoms quite well.  He has no complaints of excessive leg edema at this time.  Nursing has no new adverse events reported to me.    Current high risk medication:  Baclofen  Tamsulosin  Florinef    Laboratory examination from 2024:  Potassium 3.7  Bicarbonate 30  Creatinine 0.9  Albumin 4.0  Liver injury test normal  Magnesium 2.1  Hemoglobin 13.8  Vitamin B12 819  TSH 4.3  Vitamin D 26    Review of Systems   Constitutional:  Positive for activity change. Negative for chills, fatigue and fever.   Respiratory:  Negative for cough and shortness of breath.    Cardiovascular:  Negative for chest pain and palpitations.   Gastrointestinal:  Negative for abdominal pain, constipation, diarrhea, nausea and vomiting.   Genitourinary:  Negative for difficulty urinating.        Uses external catheter at night   Neurological:  Positive for weakness.       Objective  /68   Pulse 79   Temp 36.6 °C (97.8 °F)   Resp 18   Wt 83.9 kg (185 lb)   SpO2 97%   BMI 25.80 kg/m²     Physical Exam  Constitutional:       General: He is not in acute distress.     Comments: Bradykinesia, slow speech, sitting up in wheelchair.     HENT:      Head: Normocephalic and atraumatic.   Eyes:      Conjunctiva/sclera: Conjunctivae normal.   Cardiovascular:      Rate and Rhythm: Normal rate and regular rhythm.   Pulmonary:      Effort: Pulmonary effort is normal. No respiratory distress.      Breath sounds: Normal breath sounds.   Abdominal:      General: Bowel sounds are normal. There is no  distension.      Palpations: Abdomen is soft.      Tenderness: There is no abdominal tenderness.   Musculoskeletal:      Right lower leg: Edema (mild, 1+) present.      Left lower leg: Edema (mild, 1+) present.      Comments: Stiff movements, ataxia, masked facies, bradykinesia   Skin:     General: Skin is warm and dry.   Neurological:      General: No focal deficit present.      Mental Status: He is alert and oriented to person, place, and time.   Psychiatric:         Mood and Affect: Mood normal.         Behavior: Behavior normal.         Assessment/Plan  Problem List Items Addressed This Visit             ICD-10-CM    BPH (benign prostatic hyperplasia) N40.0    Cerebellar ataxia (Multi) G11.9    Multiple system atrophy (Multi) - Primary G90.3, G23.8    Parkinsonism (Multi) G20.C    Venous insufficiency I87.2    Weakness generalized R53.1    Autonomic orthostatic hypotension I95.1       8.  Will continue with restorative and supportive care as patient tolerates    B.  Laboratory examinations will continue to be monitored on an ongoing as-needed basis and should next be due in August 2024 as needed    C.  The patient's volume status appears to be stable as it can be given his medical therapy for autonomic orthostatic hypotension.    D.  The patient's prognosis is guarded.      Electronically Signed By: Tucker Manrique MD   4/23/24 11:25 AM

## 2024-04-12 NOTE — PROGRESS NOTES
Subjective   Patient ID: Ismael Deras is a 68 y.o. male who is long term resident being seen and evaluated for multiple medical problems.    HPI   68-year-old male patient is resting comfortably in his room being visited by a friend.  He has no complaints of pain or shortness of breath to me.  He reports that his current antacid regimen is as excellent and is controlling his symptoms quite well.  He has no complaints of excessive leg edema at this time.  Nursing has no new adverse events reported to me.    Current high risk medication:  Baclofen  Tamsulosin  Florinef    Laboratory examination from February 2024:  Potassium 3.7  Bicarbonate 30  Creatinine 0.9  Albumin 4.0  Liver injury test normal  Magnesium 2.1  Hemoglobin 13.8  Vitamin B12 819  TSH 4.3  Vitamin D 26    Review of Systems   Constitutional:  Positive for activity change. Negative for chills, fatigue and fever.   Respiratory:  Negative for cough and shortness of breath.    Cardiovascular:  Negative for chest pain and palpitations.   Gastrointestinal:  Negative for abdominal pain, constipation, diarrhea, nausea and vomiting.   Genitourinary:  Negative for difficulty urinating.        Uses external catheter at night   Neurological:  Positive for weakness.       Objective   /68   Pulse 79   Temp 36.6 °C (97.8 °F)   Resp 18   Wt 83.9 kg (185 lb)   SpO2 97%   BMI 25.80 kg/m²     Physical Exam  Constitutional:       General: He is not in acute distress.     Comments: Bradykinesia, slow speech, sitting up in wheelchair.     HENT:      Head: Normocephalic and atraumatic.   Eyes:      Conjunctiva/sclera: Conjunctivae normal.   Cardiovascular:      Rate and Rhythm: Normal rate and regular rhythm.   Pulmonary:      Effort: Pulmonary effort is normal. No respiratory distress.      Breath sounds: Normal breath sounds.   Abdominal:      General: Bowel sounds are normal. There is no distension.      Palpations: Abdomen is soft.      Tenderness: There is  no abdominal tenderness.   Musculoskeletal:      Right lower leg: Edema (mild, 1+) present.      Left lower leg: Edema (mild, 1+) present.      Comments: Stiff movements, ataxia, masked facies, bradykinesia   Skin:     General: Skin is warm and dry.   Neurological:      General: No focal deficit present.      Mental Status: He is alert and oriented to person, place, and time.   Psychiatric:         Mood and Affect: Mood normal.         Behavior: Behavior normal.         Assessment/Plan   Problem List Items Addressed This Visit             ICD-10-CM    BPH (benign prostatic hyperplasia) N40.0    Cerebellar ataxia (Multi) G11.9    Multiple system atrophy (Multi) - Primary G90.3, G23.8    Parkinsonism (Multi) G20.C    Venous insufficiency I87.2    Weakness generalized R53.1    Autonomic orthostatic hypotension I95.1       8.  Will continue with restorative and supportive care as patient tolerates    B.  Laboratory examinations will continue to be monitored on an ongoing as-needed basis and should next be due in August 2024 as needed    C.  The patient's volume status appears to be stable as it can be given his medical therapy for autonomic orthostatic hypotension.    D.  The patient's prognosis is guarded.

## 2024-04-23 ASSESSMENT — ENCOUNTER SYMPTOMS
CONSTIPATION: 0
COUGH: 0
NAUSEA: 0
SHORTNESS OF BREATH: 0
CHILLS: 0
WEAKNESS: 1
ACTIVITY CHANGE: 1
PALPITATIONS: 0
DIARRHEA: 0
DIFFICULTY URINATING: 0
ABDOMINAL PAIN: 0
VOMITING: 0
FEVER: 0
FATIGUE: 0

## 2024-05-15 ENCOUNTER — NURSING HOME VISIT (OUTPATIENT)
Dept: POST ACUTE CARE | Facility: EXTERNAL LOCATION | Age: 69
End: 2024-05-15
Payer: MEDICARE

## 2024-05-15 DIAGNOSIS — G90.3 MULTIPLE SYSTEM ATROPHY (MULTI): Primary | ICD-10-CM

## 2024-05-15 DIAGNOSIS — G62.9 POLYNEUROPATHY: ICD-10-CM

## 2024-05-15 DIAGNOSIS — G20.C PARKINSONISM, UNSPECIFIED PARKINSONISM TYPE (MULTI): ICD-10-CM

## 2024-05-15 DIAGNOSIS — R29.6 REPEATED FALLS: ICD-10-CM

## 2024-05-15 DIAGNOSIS — R26.2 IMPAIRED AMBULATION: ICD-10-CM

## 2024-05-15 DIAGNOSIS — I87.2 VENOUS INSUFFICIENCY: ICD-10-CM

## 2024-05-15 DIAGNOSIS — G23.8 MULTIPLE SYSTEM ATROPHY (MULTI): Primary | ICD-10-CM

## 2024-05-15 DIAGNOSIS — I95.1 AUTONOMIC ORTHOSTATIC HYPOTENSION: ICD-10-CM

## 2024-05-15 PROCEDURE — 99308 SBSQ NF CARE LOW MDM 20: CPT | Performed by: INTERNAL MEDICINE

## 2024-05-15 NOTE — LETTER
Patient: Ismael Deras  : 1955    Encounter Date: 05/15/2024    Subjective  Patient ID: Ismael Deras is a 68 y.o. male who is long term resident being seen and evaluated for multiple medical problems.    HPI   This 68-year-old male patient is resting comfortably in his room in no distress.  He appears to occasionally get stuck with regard to movement.  He reports that the neuropathy in his legs may be getting worse but he asks that I not change his medications at this time.  He reports that he has occasional slow days.  Today is a slow day.    Current high risk medication:  Baclofen  Tamsulosin  Valacyclovir  Florinef    Laboratory examination from 2024:  Potassium 3.7  Bicarbonate 30  Creatinine 0.9  Albumin 4.0  Liver injury test normal  Magnesium 2.1  Vitamin D 26  Hemoglobin 13.8  Vitamin B-12 819  TSH 4.3    Review of Systems   Constitutional:  Positive for activity change. Negative for chills, fatigue and fever.   Respiratory:  Negative for cough and shortness of breath.    Cardiovascular:  Negative for chest pain and palpitations.   Gastrointestinal:  Negative for abdominal pain, constipation, diarrhea, nausea and vomiting.   Genitourinary:  Negative for difficulty urinating.        Uses external catheter at night   Neurological:  Positive for weakness.       Objective  /68   Pulse 72   Temp 36.4 °C (97.6 °F)   Resp 18   Wt 80.7 kg (178 lb)   SpO2 96%   BMI 24.83 kg/m²     Physical Exam  Constitutional:       General: He is not in acute distress.     Comments: Bradykinesia, slow speech, sitting up in wheelchair.     HENT:      Head: Normocephalic and atraumatic.   Eyes:      Conjunctiva/sclera: Conjunctivae normal.   Cardiovascular:      Rate and Rhythm: Normal rate and regular rhythm.   Pulmonary:      Effort: Pulmonary effort is normal. No respiratory distress.      Breath sounds: Normal breath sounds.   Abdominal:      General: Bowel sounds are normal. There is no distension.       Palpations: Abdomen is soft.      Tenderness: There is no abdominal tenderness.   Musculoskeletal:      Right lower leg: Edema (mild, 1+) present.      Left lower leg: Edema (mild, 1+) present.      Comments: Stiff movements, ataxia, masked facies, bradykinesia   Skin:     General: Skin is warm and dry.   Neurological:      General: No focal deficit present.      Mental Status: He is alert and oriented to person, place, and time.   Psychiatric:         Mood and Affect: Mood normal.         Behavior: Behavior normal.         Assessment/Plan  Problem List Items Addressed This Visit             ICD-10-CM    Impaired ambulation R26.2    Multiple system atrophy (Multi) - Primary G90.3, G23.8    Parkinsonism (Multi) G20.C    Polyneuropathy G62.9    Repeated falls R29.6    Venous insufficiency I87.2    Autonomic orthostatic hypotension I95.1       8.  We will continue with restorative and supportive care as the patient tolerance    B.  Laboratory examinations will next be due in August 2024 or as needed    C.  The patient's request I will not alter his medications for neuropathy or movement at this time.    D.  The patient's prognosis is poor.      Electronically Signed By: Tucker Manrique MD   5/21/24  9:54 AM

## 2024-05-20 VITALS
OXYGEN SATURATION: 96 % | WEIGHT: 178 LBS | BODY MASS INDEX: 24.83 KG/M2 | DIASTOLIC BLOOD PRESSURE: 68 MMHG | TEMPERATURE: 97.6 F | RESPIRATION RATE: 18 BRPM | HEART RATE: 72 BPM | SYSTOLIC BLOOD PRESSURE: 127 MMHG

## 2024-05-20 NOTE — PROGRESS NOTES
Subjective   Patient ID: Ismael Deras is a 68 y.o. male who is long term resident being seen and evaluated for multiple medical problems.    HPI   This 68-year-old male patient is resting comfortably in his room in no distress.  He appears to occasionally get stuck with regard to movement.  He reports that the neuropathy in his legs may be getting worse but he asks that I not change his medications at this time.  He reports that he has occasional slow days.  Today is a slow day.    Current high risk medication:  Baclofen  Tamsulosin  Valacyclovir  Florinef    Laboratory examination from February 2024:  Potassium 3.7  Bicarbonate 30  Creatinine 0.9  Albumin 4.0  Liver injury test normal  Magnesium 2.1  Vitamin D 26  Hemoglobin 13.8  Vitamin B-12 819  TSH 4.3    Review of Systems   Constitutional:  Positive for activity change. Negative for chills, fatigue and fever.   Respiratory:  Negative for cough and shortness of breath.    Cardiovascular:  Negative for chest pain and palpitations.   Gastrointestinal:  Negative for abdominal pain, constipation, diarrhea, nausea and vomiting.   Genitourinary:  Negative for difficulty urinating.        Uses external catheter at night   Neurological:  Positive for weakness.       Objective   /68   Pulse 72   Temp 36.4 °C (97.6 °F)   Resp 18   Wt 80.7 kg (178 lb)   SpO2 96%   BMI 24.83 kg/m²     Physical Exam  Constitutional:       General: He is not in acute distress.     Comments: Bradykinesia, slow speech, sitting up in wheelchair.     HENT:      Head: Normocephalic and atraumatic.   Eyes:      Conjunctiva/sclera: Conjunctivae normal.   Cardiovascular:      Rate and Rhythm: Normal rate and regular rhythm.   Pulmonary:      Effort: Pulmonary effort is normal. No respiratory distress.      Breath sounds: Normal breath sounds.   Abdominal:      General: Bowel sounds are normal. There is no distension.      Palpations: Abdomen is soft.      Tenderness: There is no  abdominal tenderness.   Musculoskeletal:      Right lower leg: Edema (mild, 1+) present.      Left lower leg: Edema (mild, 1+) present.      Comments: Stiff movements, ataxia, masked facies, bradykinesia   Skin:     General: Skin is warm and dry.   Neurological:      General: No focal deficit present.      Mental Status: He is alert and oriented to person, place, and time.   Psychiatric:         Mood and Affect: Mood normal.         Behavior: Behavior normal.         Assessment/Plan   Problem List Items Addressed This Visit             ICD-10-CM    Impaired ambulation R26.2    Multiple system atrophy (Multi) - Primary G90.3, G23.8    Parkinsonism (Multi) G20.C    Polyneuropathy G62.9    Repeated falls R29.6    Venous insufficiency I87.2    Autonomic orthostatic hypotension I95.1       8.  We will continue with restorative and supportive care as the patient tolerance    B.  Laboratory examinations will next be due in August 2024 or as needed    C.  The patient's request I will not alter his medications for neuropathy or movement at this time.    D.  The patient's prognosis is poor.

## 2024-05-21 ENCOUNTER — NURSING HOME VISIT (OUTPATIENT)
Dept: POST ACUTE CARE | Facility: EXTERNAL LOCATION | Age: 69
End: 2024-05-21
Payer: MEDICARE

## 2024-05-21 VITALS
DIASTOLIC BLOOD PRESSURE: 68 MMHG | HEART RATE: 72 BPM | TEMPERATURE: 97.9 F | OXYGEN SATURATION: 96 % | SYSTOLIC BLOOD PRESSURE: 127 MMHG | BODY MASS INDEX: 24.83 KG/M2 | WEIGHT: 178 LBS | RESPIRATION RATE: 18 BRPM

## 2024-05-21 DIAGNOSIS — I95.1 AUTONOMIC ORTHOSTATIC HYPOTENSION: ICD-10-CM

## 2024-05-21 DIAGNOSIS — G23.8 MULTIPLE SYSTEM ATROPHY (MULTI): Primary | ICD-10-CM

## 2024-05-21 DIAGNOSIS — E03.9 HYPOTHYROIDISM, UNSPECIFIED TYPE: ICD-10-CM

## 2024-05-21 DIAGNOSIS — R26.2 IMPAIRED AMBULATION: ICD-10-CM

## 2024-05-21 DIAGNOSIS — G90.3 MULTIPLE SYSTEM ATROPHY (MULTI): Primary | ICD-10-CM

## 2024-05-21 PROCEDURE — 99309 SBSQ NF CARE MODERATE MDM 30: CPT | Performed by: NURSE PRACTITIONER

## 2024-05-21 ASSESSMENT — ENCOUNTER SYMPTOMS
ACTIVITY CHANGE: 1
FEVER: 0
COUGH: 0
WEAKNESS: 1
ABDOMINAL PAIN: 0
VOMITING: 0
DIFFICULTY URINATING: 0
SHORTNESS OF BREATH: 0
NAUSEA: 0
PALPITATIONS: 0
DIARRHEA: 0
CHILLS: 0
FATIGUE: 0
CONSTIPATION: 0

## 2024-05-21 NOTE — LETTER
Patient: Ismael Deras  : 1955    Encounter Date: 2024    Subjective  Ismael Deras is a 68 y.o. male Here for routine monthly visit  HPI    There are no new problems and multiple health problems have been reviewed.  The course has been unchanged.  The patient  is alert and oriented.   There are no family members present during time of visit.    He is sitting up in chair , denies any complaints when asked except for intermittent neuropathy, no acute changes, noted mostly at night.   Eating and drinking well.   No concerns per staff.          Review of Systems   Constitutional:  Positive for activity change. Negative for chills, fatigue and fever.   Respiratory:  Negative for cough and shortness of breath.    Cardiovascular:  Negative for chest pain and palpitations.   Gastrointestinal:  Negative for abdominal pain, constipation, diarrhea, nausea and vomiting.   Genitourinary:  Negative for difficulty urinating.        Uses external catheter at night   Neurological:  Positive for weakness.       Objective  /68   Pulse 72   Temp 36.6 °C (97.9 °F)   Resp 18   Wt 80.7 kg (178 lb)   SpO2 96%   BMI 24.83 kg/m²     Physical Exam  Constitutional:       General: He is not in acute distress.     Comments: Bradykinesia, slow speech, sitting up in wheelchair.     HENT:      Head: Normocephalic and atraumatic.   Eyes:      Conjunctiva/sclera: Conjunctivae normal.   Cardiovascular:      Rate and Rhythm: Normal rate and regular rhythm.   Pulmonary:      Effort: Pulmonary effort is normal. No respiratory distress.      Breath sounds: Normal breath sounds.   Abdominal:      General: Bowel sounds are normal. There is no distension.      Palpations: Abdomen is soft.      Tenderness: There is no abdominal tenderness.   Musculoskeletal:      Right lower leg: Edema (mild, 1+) present.      Left lower leg: Edema (mild, 1+) present.      Comments: Stiff movements, ataxia, masked facies, bradykinesia   Skin:      General: Skin is warm and dry.   Neurological:      General: No focal deficit present.      Mental Status: He is alert and oriented to person, place, and time.   Psychiatric:         Mood and Affect: Mood normal.         Behavior: Behavior normal.         Assessment/Plan  Problem List Items Addressed This Visit       Hypothyroidism     On levothyroxine, monitor with routine labs.  TSH normal 2/24         Impaired ambulation     Uses wheelchair for mobility.          Multiple system atrophy (Multi) - Primary     Follows with Dr Abrams.    He is nonambulatory.          Autonomic orthostatic hypotension     Overall controlled on florinef.  Continue to monitor and adjust meds based on clinical course.            labs/meds/orders reviewed  staff to monitor and notify for any changes.  Follow up with Dr Abrams for multisystem atrophy  Next labs 8/24 and prn  Monitor for any sx of aspiration  If neuropathy becomes worse may consider trial of gabapentin, he is scheduled to see his neurologist/movement specialist in early July.        Electronically Signed By: ANDREW Mckeon   5/24/24  8:02 PM

## 2024-05-21 NOTE — PROGRESS NOTES
Subjective   Ismael Deras is a 68 y.o. male Here for routine monthly visit  HPI    There are no new problems and multiple health problems have been reviewed.  The course has been unchanged.  The patient  is alert and oriented.   There are no family members present during time of visit.    He is sitting up in chair , denies any complaints when asked except for intermittent neuropathy, no acute changes, noted mostly at night.   Eating and drinking well.   No concerns per staff.          Review of Systems   Constitutional:  Positive for activity change. Negative for chills, fatigue and fever.   Respiratory:  Negative for cough and shortness of breath.    Cardiovascular:  Negative for chest pain and palpitations.   Gastrointestinal:  Negative for abdominal pain, constipation, diarrhea, nausea and vomiting.   Genitourinary:  Negative for difficulty urinating.        Uses external catheter at night   Neurological:  Positive for weakness.       Objective   /68   Pulse 72   Temp 36.6 °C (97.9 °F)   Resp 18   Wt 80.7 kg (178 lb)   SpO2 96%   BMI 24.83 kg/m²     Physical Exam  Constitutional:       General: He is not in acute distress.     Comments: Bradykinesia, slow speech, sitting up in wheelchair.     HENT:      Head: Normocephalic and atraumatic.   Eyes:      Conjunctiva/sclera: Conjunctivae normal.   Cardiovascular:      Rate and Rhythm: Normal rate and regular rhythm.   Pulmonary:      Effort: Pulmonary effort is normal. No respiratory distress.      Breath sounds: Normal breath sounds.   Abdominal:      General: Bowel sounds are normal. There is no distension.      Palpations: Abdomen is soft.      Tenderness: There is no abdominal tenderness.   Musculoskeletal:      Right lower leg: Edema (mild, 1+) present.      Left lower leg: Edema (mild, 1+) present.      Comments: Stiff movements, ataxia, masked facies, bradykinesia   Skin:     General: Skin is warm and dry.   Neurological:      General: No focal  deficit present.      Mental Status: He is alert and oriented to person, place, and time.   Psychiatric:         Mood and Affect: Mood normal.         Behavior: Behavior normal.         Assessment/Plan   Problem List Items Addressed This Visit       Hypothyroidism     On levothyroxine, monitor with routine labs.  TSH normal 2/24         Impaired ambulation     Uses wheelchair for mobility.          Multiple system atrophy (Multi) - Primary     Follows with Dr Abrams.    He is nonambulatory.          Autonomic orthostatic hypotension     Overall controlled on florinef.  Continue to monitor and adjust meds based on clinical course.            labs/meds/orders reviewed  staff to monitor and notify for any changes.  Follow up with Dr Abrams for multisystem atrophy  Next labs 8/24 and prn  Monitor for any sx of aspiration  If neuropathy becomes worse may consider trial of gabapentin, he is scheduled to see his neurologist/movement specialist in early July.

## 2024-05-24 ASSESSMENT — ENCOUNTER SYMPTOMS
SHORTNESS OF BREATH: 0
FEVER: 0
VOMITING: 0
DIFFICULTY URINATING: 0
DIARRHEA: 0
PALPITATIONS: 0
NAUSEA: 0
WEAKNESS: 1
ABDOMINAL PAIN: 0
ACTIVITY CHANGE: 1
CHILLS: 0
CONSTIPATION: 0
FATIGUE: 0
COUGH: 0

## 2024-06-06 ENCOUNTER — LAB REQUISITION (OUTPATIENT)
Dept: LAB | Facility: HOSPITAL | Age: 69
End: 2024-06-06
Payer: MEDICARE

## 2024-06-06 DIAGNOSIS — Z13.228 ENCOUNTER FOR SCREENING FOR OTHER METABOLIC DISORDERS: ICD-10-CM

## 2024-06-06 DIAGNOSIS — R79.9 ABNORMAL FINDING OF BLOOD CHEMISTRY, UNSPECIFIED: ICD-10-CM

## 2024-06-06 LAB
ANION GAP SERPL CALC-SCNC: 11 MMOL/L (ref 10–20)
BUN SERPL-MCNC: 25 MG/DL (ref 6–23)
CALCIUM SERPL-MCNC: 8.3 MG/DL (ref 8.6–10.3)
CHLORIDE SERPL-SCNC: 98 MMOL/L (ref 98–107)
CO2 SERPL-SCNC: 33 MMOL/L (ref 21–32)
CREAT SERPL-MCNC: 0.9 MG/DL (ref 0.5–1.3)
EGFRCR SERPLBLD CKD-EPI 2021: >90 ML/MIN/1.73M*2
ERYTHROCYTE [DISTWIDTH] IN BLOOD BY AUTOMATED COUNT: 13.2 % (ref 11.5–14.5)
GLUCOSE SERPL-MCNC: 101 MG/DL (ref 74–99)
HCT VFR BLD AUTO: 35.1 % (ref 41–52)
HGB BLD-MCNC: 11.2 G/DL (ref 13.5–17.5)
MCH RBC QN AUTO: 29.8 PG (ref 26–34)
MCHC RBC AUTO-ENTMCNC: 31.9 G/DL (ref 32–36)
MCV RBC AUTO: 93 FL (ref 80–100)
NRBC BLD-RTO: 0 /100 WBCS (ref 0–0)
PLATELET # BLD AUTO: 399 X10*3/UL (ref 150–450)
POTASSIUM SERPL-SCNC: 4.5 MMOL/L (ref 3.5–5.3)
RBC # BLD AUTO: 3.76 X10*6/UL (ref 4.5–5.9)
SODIUM SERPL-SCNC: 137 MMOL/L (ref 136–145)
WBC # BLD AUTO: 11.5 X10*3/UL (ref 4.4–11.3)

## 2024-06-06 PROCEDURE — 80048 BASIC METABOLIC PNL TOTAL CA: CPT | Mod: OUT | Performed by: NURSE PRACTITIONER

## 2024-06-06 PROCEDURE — 85027 COMPLETE CBC AUTOMATED: CPT | Mod: OUT | Performed by: NURSE PRACTITIONER

## 2024-06-21 ENCOUNTER — NURSING HOME VISIT (OUTPATIENT)
Dept: POST ACUTE CARE | Facility: EXTERNAL LOCATION | Age: 69
End: 2024-06-21
Payer: MEDICARE

## 2024-06-21 VITALS
RESPIRATION RATE: 18 BRPM | HEART RATE: 77 BPM | DIASTOLIC BLOOD PRESSURE: 59 MMHG | WEIGHT: 180 LBS | OXYGEN SATURATION: 96 % | SYSTOLIC BLOOD PRESSURE: 100 MMHG | BODY MASS INDEX: 25.1 KG/M2 | TEMPERATURE: 97.9 F

## 2024-06-21 DIAGNOSIS — R26.89 BALANCE PROBLEM: ICD-10-CM

## 2024-06-21 DIAGNOSIS — G90.3 MULTIPLE SYSTEM ATROPHY (MULTI): Primary | ICD-10-CM

## 2024-06-21 DIAGNOSIS — I95.1 ORTHOSTATIC HYPOTENSION: ICD-10-CM

## 2024-06-21 DIAGNOSIS — G11.9 CEREBELLAR ATAXIA (MULTI): ICD-10-CM

## 2024-06-21 DIAGNOSIS — R53.1 WEAKNESS GENERALIZED: ICD-10-CM

## 2024-06-21 DIAGNOSIS — G23.8 MULTIPLE SYSTEM ATROPHY (MULTI): Primary | ICD-10-CM

## 2024-06-21 PROCEDURE — 99309 SBSQ NF CARE MODERATE MDM 30: CPT | Performed by: INTERNAL MEDICINE

## 2024-06-21 NOTE — PROGRESS NOTES
Subjective   Patient ID: Ismael Deras is a 68 y.o. male who is long term resident being seen and evaluated for multiple medical problems.    HPI   This 68-year-old male patient is up in his wheelchair leaning back resting comfortably in no distress.  He thinks that his movement is getting worse over time.  Nursing has no new adverse events reported to me.  I offered the patient new medication to help with his freezing phenomenon however he says he does not want any new medication at this time.    Current high risk medication:  Baclofen  Tamsulosin  Valacyclovir  Florinef    Laboratory examination from February 2024 been reviewed in detail by me.  They are unremarkable.      Review of Systems   Constitutional:  Positive for activity change. Negative for chills, fatigue and fever.   Respiratory:  Negative for cough and shortness of breath.    Cardiovascular:  Negative for chest pain and palpitations.   Gastrointestinal:  Negative for abdominal pain, constipation, diarrhea, nausea and vomiting.   Genitourinary:  Negative for difficulty urinating.        Uses external catheter at night   Neurological:  Positive for weakness.       Objective   /59   Pulse 77   Temp 36.6 °C (97.9 °F)   Resp 18   Wt 81.6 kg (180 lb)   SpO2 96%   BMI 25.10 kg/m²     Physical Exam  Constitutional:       General: He is not in acute distress.     Comments: Bradykinesia, slow speech, sitting up in wheelchair.     HENT:      Head: Normocephalic and atraumatic.   Eyes:      Conjunctiva/sclera: Conjunctivae normal.   Cardiovascular:      Rate and Rhythm: Normal rate and regular rhythm.   Pulmonary:      Effort: Pulmonary effort is normal. No respiratory distress.      Breath sounds: Normal breath sounds.   Abdominal:      General: Bowel sounds are normal. There is no distension.      Palpations: Abdomen is soft.      Tenderness: There is no abdominal tenderness.   Musculoskeletal:      Right lower leg: Edema (mild, 1+) present.       Left lower leg: Edema (mild, 1+) present.      Comments: Stiff movements, ataxia, masked facies, bradykinesia   Skin:     General: Skin is warm and dry.   Neurological:      General: No focal deficit present.      Mental Status: He is alert and oriented to person, place, and time.   Psychiatric:         Mood and Affect: Mood normal.         Behavior: Behavior normal.         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  We will continue with restorative and supportive care as the patient tolerance    B.  The patient appears to have freezing phenomenon where he is unable to move for long periods of time.  This in my mind is highly suggestive of dopamine deficiency and therefore if the patient wishes I would recommending giving him dopaminergic therapy in the form of either Sinemet or perhaps ropinirole    C.  Laboratory examinations will next be due in August 2024 as needed    D.  The patient's prognosis is poor.

## 2024-06-21 NOTE — LETTER
Patient: Ismael Deras  : 1955    Encounter Date: 2024    Subjective  Patient ID: Ismael Deras is a 68 y.o. male who is long term resident being seen and evaluated for multiple medical problems.    HPI   This 68-year-old male patient is up in his wheelchair leaning back resting comfortably in no distress.  He thinks that his movement is getting worse over time.  Nursing has no new adverse events reported to me.  I offered the patient new medication to help with his freezing phenomenon however he says he does not want any new medication at this time.    Current high risk medication:  Baclofen  Tamsulosin  Valacyclovir  Florinef    Laboratory examination from 2024 been reviewed in detail by me.  They are unremarkable.      Review of Systems   Constitutional:  Positive for activity change. Negative for chills, fatigue and fever.   Respiratory:  Negative for cough and shortness of breath.    Cardiovascular:  Negative for chest pain and palpitations.   Gastrointestinal:  Negative for abdominal pain, constipation, diarrhea, nausea and vomiting.   Genitourinary:  Negative for difficulty urinating.        Uses external catheter at night   Neurological:  Positive for weakness.       Objective  /59   Pulse 77   Temp 36.6 °C (97.9 °F)   Resp 18   Wt 81.6 kg (180 lb)   SpO2 96%   BMI 25.10 kg/m²     Physical Exam  Constitutional:       General: He is not in acute distress.     Comments: Bradykinesia, slow speech, sitting up in wheelchair.     HENT:      Head: Normocephalic and atraumatic.   Eyes:      Conjunctiva/sclera: Conjunctivae normal.   Cardiovascular:      Rate and Rhythm: Normal rate and regular rhythm.   Pulmonary:      Effort: Pulmonary effort is normal. No respiratory distress.      Breath sounds: Normal breath sounds.   Abdominal:      General: Bowel sounds are normal. There is no distension.      Palpations: Abdomen is soft.      Tenderness: There is no abdominal tenderness.    Musculoskeletal:      Right lower leg: Edema (mild, 1+) present.      Left lower leg: Edema (mild, 1+) present.      Comments: Stiff movements, ataxia, masked facies, bradykinesia   Skin:     General: Skin is warm and dry.   Neurological:      General: No focal deficit present.      Mental Status: He is alert and oriented to person, place, and time.   Psychiatric:         Mood and Affect: Mood normal.         Behavior: Behavior normal.         Assessment/Plan  Problem List Items Addressed This Visit             ICD-10-CM    Balance problem R26.89    Cerebellar ataxia (Multi) G11.9    Multiple system atrophy (Multi) - Primary G90.3, G23.8    Orthostatic hypotension I95.1    Weakness generalized R53.1     8.  We will continue with restorative and supportive care as the patient tolerance    B.  The patient appears to have freezing phenomenon where he is unable to move for long periods of time.  This in my mind is highly suggestive of dopamine deficiency and therefore if the patient wishes I would recommending giving him dopaminergic therapy in the form of either Sinemet or perhaps ropinirole    C.  Laboratory examinations will next be due in August 2024 as needed    D.  The patient's prognosis is poor.        Electronically Signed By: Tucker Manrique MD   6/27/24  6:26 PM

## 2024-06-25 ENCOUNTER — APPOINTMENT (OUTPATIENT)
Dept: UROLOGY | Facility: CLINIC | Age: 69
End: 2024-06-25
Payer: MEDICARE

## 2024-06-27 ENCOUNTER — NURSING HOME VISIT (OUTPATIENT)
Dept: POST ACUTE CARE | Facility: EXTERNAL LOCATION | Age: 69
End: 2024-06-27

## 2024-06-27 ENCOUNTER — NURSING HOME VISIT (OUTPATIENT)
Dept: POST ACUTE CARE | Facility: EXTERNAL LOCATION | Age: 69
End: 2024-06-27
Payer: MEDICARE

## 2024-06-27 VITALS
TEMPERATURE: 97.6 F | OXYGEN SATURATION: 95 % | WEIGHT: 180 LBS | DIASTOLIC BLOOD PRESSURE: 84 MMHG | HEART RATE: 61 BPM | BODY MASS INDEX: 25.1 KG/M2 | SYSTOLIC BLOOD PRESSURE: 126 MMHG | RESPIRATION RATE: 18 BRPM

## 2024-06-27 VITALS
BODY MASS INDEX: 24.97 KG/M2 | TEMPERATURE: 98.5 F | HEART RATE: 61 BPM | WEIGHT: 179 LBS | OXYGEN SATURATION: 95 % | SYSTOLIC BLOOD PRESSURE: 126 MMHG | RESPIRATION RATE: 18 BRPM | DIASTOLIC BLOOD PRESSURE: 84 MMHG

## 2024-06-27 DIAGNOSIS — R27.0 ATAXIA: ICD-10-CM

## 2024-06-27 DIAGNOSIS — G23.8 MULTIPLE SYSTEM ATROPHY (MULTI): ICD-10-CM

## 2024-06-27 DIAGNOSIS — G90.3 MULTIPLE SYSTEM ATROPHY (MULTI): ICD-10-CM

## 2024-06-27 DIAGNOSIS — J69.0 ASPIRATION PNEUMONIA, UNSPECIFIED ASPIRATION PNEUMONIA TYPE, UNSPECIFIED LATERALITY, UNSPECIFIED PART OF LUNG (MULTI): Primary | ICD-10-CM

## 2024-06-27 DIAGNOSIS — R13.10 DYSPHAGIA, UNSPECIFIED TYPE: ICD-10-CM

## 2024-06-27 DIAGNOSIS — R13.10 DYSPHAGIA, UNSPECIFIED TYPE: Primary | ICD-10-CM

## 2024-06-27 DIAGNOSIS — R26.2 IMPAIRED AMBULATION: ICD-10-CM

## 2024-06-27 DIAGNOSIS — I95.1 AUTONOMIC ORTHOSTATIC HYPOTENSION: ICD-10-CM

## 2024-06-27 DIAGNOSIS — R29.6 REPEATED FALLS: ICD-10-CM

## 2024-06-27 DIAGNOSIS — J69.0 ASPIRATION PNEUMONIA, UNSPECIFIED ASPIRATION PNEUMONIA TYPE, UNSPECIFIED LATERALITY, UNSPECIFIED PART OF LUNG (MULTI): ICD-10-CM

## 2024-06-27 DIAGNOSIS — I95.1 ORTHOSTATIC HYPOTENSION: ICD-10-CM

## 2024-06-27 PROCEDURE — 99310 SBSQ NF CARE HIGH MDM 45: CPT | Performed by: NURSE PRACTITIONER

## 2024-06-27 PROCEDURE — 99306 1ST NF CARE HIGH MDM 50: CPT | Performed by: INTERNAL MEDICINE

## 2024-06-27 ASSESSMENT — ENCOUNTER SYMPTOMS
DIFFICULTY URINATING: 0
VOMITING: 0
NAUSEA: 0
ACTIVITY CHANGE: 1
WEAKNESS: 1
NAUSEA: 0
CONSTIPATION: 0
ABDOMINAL PAIN: 0
VOMITING: 0
FATIGUE: 1
DIARRHEA: 0
ABDOMINAL PAIN: 0
FEVER: 0
PALPITATIONS: 0
WEAKNESS: 1
CHILLS: 0
SHORTNESS OF BREATH: 0
WEAKNESS: 1
FATIGUE: 0
PALPITATIONS: 0
COUGH: 1
COUGH: 0
FEVER: 0
ABDOMINAL PAIN: 0
CONSTIPATION: 0
DIFFICULTY URINATING: 0
FATIGUE: 1
CHILLS: 0
FEVER: 0
VOMITING: 0
CHILLS: 0
PALPITATIONS: 0
CONSTIPATION: 0
ACTIVITY CHANGE: 1
SHORTNESS OF BREATH: 1
ACTIVITY CHANGE: 1
COUGH: 1
DIFFICULTY URINATING: 0
DIARRHEA: 0
DIARRHEA: 0
NAUSEA: 0
SHORTNESS OF BREATH: 1

## 2024-06-27 NOTE — PROGRESS NOTES
Subjective   Ismael Deras is a 68 y.o. male returns from hospitalization due to aspiration pna.   HPI  He developed hypoxia and sensation of food stuck in his throat prompting hospitalizaiton.  He was found to have multifocal pna and started on atb.  He was evaluated by speech therapy due to concerns for aspiration and failed MBS on all consistencies.  Goals of care was discussed and decision was made to return to facility with hospice care.  He is resting in bed, appears weak, on oxygen, minimally talking.  He shakes head no when asked if in pain.   Hospice admission planned for later today.       Labs:  6/25  WBC: 9.54  Hgb: 10.3  Hct: 32.8  Platelet: 197    Na: 146  K: 3.7  Cl: 107  Co2: 29  BUN: 20  Creatinine: 1.03    MEDS:  Augmentin  Baclofen  Lasix  Lansoprazole  Fludrocortisone  Levothyroxine  tamsulosin            Review of Systems   Constitutional:  Positive for activity change and fatigue. Negative for chills and fever.   Respiratory:  Positive for cough and shortness of breath (mild).    Cardiovascular:  Negative for chest pain and palpitations.   Gastrointestinal:  Negative for abdominal pain, constipation, diarrhea, nausea and vomiting.   Genitourinary:  Negative for difficulty urinating.   Neurological:  Positive for weakness.       Objective   /84   Pulse 61   Temp 36.9 °C (98.5 °F)   Resp 18   Wt 81.2 kg (179 lb)   SpO2 95%   BMI 24.97 kg/m²     Physical Exam  Constitutional:       General: He is not in acute distress.     Comments: Bradykinesia, sleepy but arousable.  Appears much weaker than previous visit, resting in bed.    HENT:      Head: Normocephalic and atraumatic.   Eyes:      Conjunctiva/sclera: Conjunctivae normal.   Cardiovascular:      Rate and Rhythm: Normal rate and regular rhythm.   Pulmonary:      Effort: Pulmonary effort is normal. No respiratory distress.      Breath sounds: Normal breath sounds.      Comments: Slightly increased respiratory rate.     Abdominal:       General: Bowel sounds are normal. There is no distension.      Palpations: Abdomen is soft.      Tenderness: There is no abdominal tenderness.   Musculoskeletal:      Right lower leg: Edema (mild, 1+) present.      Left lower leg: Edema (mild, 1+) present.      Comments: Stiff movements, ataxia, masked facies, bradykinesia   Skin:     General: Skin is warm and dry.   Neurological:      General: No focal deficit present.      Mental Status: He is alert.      Comments: Decreased verbalization and much weaker voice quality   Psychiatric:         Mood and Affect: Mood normal.         Behavior: Behavior normal.         Assessment/Plan   Problem List Items Addressed This Visit       Dysphagia - Primary     He developed multifocal aspiration pna.  He failed MBS and per speech therapy  does not appear safe to initiate oral diet.  Goals of care was discussed and he is discharged with plans for admission to hospice care later today.           Multiple system atrophy (Multi)     Follows with Dr Abrams.    He is nonambulatory.   He has continued to have a slow decline and has developed a nonfunctional swallow.  He is meeting with hospice later today         Orthostatic hypotension     On fludrocortisone from neurology.   continue with current medical management           Aspiration pneumonia (Multi)     labs/meds/orders reviewed  staff to monitor and notify for any changes.  Hospital records reviewed.  He is going to be admitted today to hospice care  He has minimal oral intake and difficulty taking meds with a nonfuncitonal swallow.  He appears comfortable at present but noted to have slightly increased work of breathing.   Plan for admission to hospice, he appears terminal in the absence of a functional swallow and no alternative forms of nutrition or hydration.    Time for coordination of care was greater than 35 minutes with hospital chart review, visit and exam, discussion of treatment plan with patient and also  discussion of case with staff.

## 2024-06-27 NOTE — PROGRESS NOTES
Subjective   Patient ID: Ismael Deras is a 68 y.o. male who is long term resident being seen and evaluated for multiple medical problems.    HPI   Ismael Deras is a 68 y.o. male returns from hospitalization due to aspiration pna.   HPI  He developed hypoxia and sensation of food stuck in his throat prompting hospitalizaiton.  He was found to have multifocal pna and started on atb.  He was evaluated by speech therapy due to concerns for aspiration and failed MBS on all consistencies.  Goals of care was discussed and decision was made to return to facility with hospice care.  He is resting in bed, appears weak, on oxygen, minimally talking.  He shakes head no when asked if in pain.   Hospice admission planned for later today.       The patient is resting comfortably in bed.  He is now requiring nasal cannula oxygen for comfort.  He awakes easily to verbal stimuli and shakes his head no to complaints of pain or shortness of breath.  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  Risk of aspiration is high in this case.     Labs:  6/25  WBC: 9.54  Hgb: 10.3  Hct: 32.8  Platelet: 197     Na: 146  K: 3.7  Cl: 107  Co2: 29  BUN: 20  Creatinine: 1.03     MEDS:  Augmentin  Baclofen  Lasix  Lansoprazole  Fludrocortisone  Levothyroxine  tamsulosin        Review of Systems   Constitutional:  Positive for activity change and fatigue. Negative for chills and fever.   Respiratory:  Positive for cough and shortness of breath (mild).    Cardiovascular:  Negative for chest pain and palpitations.   Gastrointestinal:  Negative for abdominal pain, constipation, diarrhea, nausea and vomiting.   Genitourinary:  Negative for difficulty urinating.   Neurological:  Positive for weakness.       Objective   /84   Pulse 61   Temp 36.4 °C (97.6 °F)   Resp 18   Wt 81.6 kg (180 lb)   SpO2 95%   BMI 25.10 kg/m²     Physical  Exam  Constitutional:       General: He is not in acute distress.     Comments: Bradykinesia, sleepy but arousable.  Appears much weaker than previous visit, resting in bed.    HENT:      Head: Normocephalic and atraumatic.      Mouth/Throat:      Mouth: Mucous membranes are moist.   Eyes:      Conjunctiva/sclera: Conjunctivae normal.   Cardiovascular:      Rate and Rhythm: Normal rate and regular rhythm.   Pulmonary:      Effort: Pulmonary effort is normal. No respiratory distress.      Breath sounds: Normal breath sounds.      Comments: Slightly increased respiratory rate.     Abdominal:      General: Bowel sounds are normal. There is no distension.      Palpations: Abdomen is soft.      Tenderness: There is no abdominal tenderness.   Musculoskeletal:      Right lower leg: Edema (mild, 1+) present.      Left lower leg: Edema (mild, 1+) present.      Comments: Stiff movements, ataxia, masked facies, bradykinesia   Skin:     General: Skin is warm and dry.   Neurological:      General: No focal deficit present.      Mental Status: He is alert.      Comments: Decreased verbalization and much weaker voice quality   Psychiatric:         Mood and Affect: Mood normal.         Behavior: Behavior normal.         Assessment/Plan   Problem List Items Addressed This Visit             ICD-10-CM    Ataxia R27.0    Dysphagia R13.10    Impaired ambulation R26.2    Multiple system atrophy (Multi) G90.3, G23.8    Repeated falls R29.6    Autonomic orthostatic hypotension I95.1    Aspiration pneumonia (Multi) - Primary J69.0     A.  We will continue with supportive and restorative care for now.  Given the patient's poor to terminal prognosis given multiple system atrophy and I agree with hospice service at this time for ongoing patient comfort.    B.  Laboratory examinations will be held due to hospice care services    C.  The patient's prognosis is [id number].

## 2024-06-27 NOTE — LETTER
Patient: Ismael Deras  : 1955    Encounter Date: 2024    Subjective  Ismael Deras is a 68 y.o. male returns from hospitalization due to aspiration pna.   HPI  He developed hypoxia and sensation of food stuck in his throat prompting hospitalizaiton.  He was found to have multifocal pna and started on atb.  He was evaluated by speech therapy due to concerns for aspiration and failed MBS on all consistencies.  Goals of care was discussed and decision was made to return to facility with hospice care.  He is resting in bed, appears weak, on oxygen, minimally talking.  He shakes head no when asked if in pain.   Hospice admission planned for later today.       Labs:    WBC: 9.54  Hgb: 10.3  Hct: 32.8  Platelet: 197    Na: 146  K: 3.7  Cl: 107  Co2: 29  BUN: 20  Creatinine: 1.03    MEDS:  Augmentin  Baclofen  Lasix  Lansoprazole  Fludrocortisone  Levothyroxine  tamsulosin            Review of Systems   Constitutional:  Positive for activity change and fatigue. Negative for chills and fever.   Respiratory:  Positive for cough and shortness of breath (mild).    Cardiovascular:  Negative for chest pain and palpitations.   Gastrointestinal:  Negative for abdominal pain, constipation, diarrhea, nausea and vomiting.   Genitourinary:  Negative for difficulty urinating.   Neurological:  Positive for weakness.       Objective  /84   Pulse 61   Temp 36.9 °C (98.5 °F)   Resp 18   Wt 81.2 kg (179 lb)   SpO2 95%   BMI 24.97 kg/m²     Physical Exam  Constitutional:       General: He is not in acute distress.     Comments: Bradykinesia, sleepy but arousable.  Appears much weaker than previous visit, resting in bed.    HENT:      Head: Normocephalic and atraumatic.   Eyes:      Conjunctiva/sclera: Conjunctivae normal.   Cardiovascular:      Rate and Rhythm: Normal rate and regular rhythm.   Pulmonary:      Effort: Pulmonary effort is normal. No respiratory distress.      Breath sounds: Normal breath sounds.       Comments: Slightly increased respiratory rate.     Abdominal:      General: Bowel sounds are normal. There is no distension.      Palpations: Abdomen is soft.      Tenderness: There is no abdominal tenderness.   Musculoskeletal:      Right lower leg: Edema (mild, 1+) present.      Left lower leg: Edema (mild, 1+) present.      Comments: Stiff movements, ataxia, masked facies, bradykinesia   Skin:     General: Skin is warm and dry.   Neurological:      General: No focal deficit present.      Mental Status: He is alert.      Comments: Decreased verbalization and much weaker voice quality   Psychiatric:         Mood and Affect: Mood normal.         Behavior: Behavior normal.         Assessment/Plan  Problem List Items Addressed This Visit       Dysphagia - Primary     He developed multifocal aspiration pna.  He failed MBS and per speech therapy  does not appear safe to initiate oral diet.  Goals of care was discussed and he is discharged with plans for admission to hospice care later today.           Multiple system atrophy (Multi)     Follows with Dr Abrams.    He is nonambulatory.   He has continued to have a slow decline and has developed a nonfunctional swallow.  He is meeting with hospice later today         Orthostatic hypotension     On fludrocortisone from neurology.   continue with current medical management           Aspiration pneumonia (Multi)     labs/meds/orders reviewed  staff to monitor and notify for any changes.  Hospital records reviewed.  He is going to be admitted today to hospice care  He has minimal oral intake and difficulty taking meds with a nonfuncitonal swallow.  He appears comfortable at present but noted to have slightly increased work of breathing.   Plan for admission to hospice, he appears terminal in the absence of a functional swallow and no alternative forms of nutrition or hydration.    Time for coordination of care was greater than 35 minutes with hospital chart review, visit  and exam, discussion of treatment plan with patient and also discussion of case with staff.        Electronically Signed By: ANDREW Mckeon   6/27/24 10:53 AM

## 2024-06-27 NOTE — ASSESSMENT & PLAN NOTE
Follows with Dr Abrams.    He is nonambulatory.   He has continued to have a slow decline and has developed a nonfunctional swallow.  He is meeting with hospice later today

## 2024-06-27 NOTE — LETTER
Patient: Ismael Deras  : 1955    Encounter Date: 2024    Subjective  Patient ID: Ismael Deras is a 68 y.o. male who is long term resident being seen and evaluated for multiple medical problems.    HPI   Ismael Deras is a 68 y.o. male returns from hospitalization due to aspiration pna.   HPI  He developed hypoxia and sensation of food stuck in his throat prompting hospitalizaiton.  He was found to have multifocal pna and started on atb.  He was evaluated by speech therapy due to concerns for aspiration and failed MBS on all consistencies.  Goals of care was discussed and decision was made to return to facility with hospice care.  He is resting in bed, appears weak, on oxygen, minimally talking.  He shakes head no when asked if in pain.   Hospice admission planned for later today.       The patient is resting comfortably in bed.  He is now requiring nasal cannula oxygen for comfort.  He awakes easily to verbal stimuli and shakes his head no to complaints of pain or shortness of breath.  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  Risk of aspiration is high in this case.     Labs:    WBC: 9.54  Hgb: 10.3  Hct: 32.8  Platelet: 197     Na: 146  K: 3.7  Cl: 107  Co2: 29  BUN: 20  Creatinine: 1.03     MEDS:  Augmentin  Baclofen  Lasix  Lansoprazole  Fludrocortisone  Levothyroxine  tamsulosin        Review of Systems   Constitutional:  Positive for activity change and fatigue. Negative for chills and fever.   Respiratory:  Positive for cough and shortness of breath (mild).    Cardiovascular:  Negative for chest pain and palpitations.   Gastrointestinal:  Negative for abdominal pain, constipation, diarrhea, nausea and vomiting.   Genitourinary:  Negative for difficulty urinating.   Neurological:  Positive for weakness.       Objective  /84   Pulse 61   Temp 36.4 °C (97.6 °F)   Resp 18    Wt 81.6 kg (180 lb)   SpO2 95%   BMI 25.10 kg/m²     Physical Exam  Constitutional:       General: He is not in acute distress.     Comments: Bradykinesia, sleepy but arousable.  Appears much weaker than previous visit, resting in bed.    HENT:      Head: Normocephalic and atraumatic.      Mouth/Throat:      Mouth: Mucous membranes are moist.   Eyes:      Conjunctiva/sclera: Conjunctivae normal.   Cardiovascular:      Rate and Rhythm: Normal rate and regular rhythm.   Pulmonary:      Effort: Pulmonary effort is normal. No respiratory distress.      Breath sounds: Normal breath sounds.      Comments: Slightly increased respiratory rate.     Abdominal:      General: Bowel sounds are normal. There is no distension.      Palpations: Abdomen is soft.      Tenderness: There is no abdominal tenderness.   Musculoskeletal:      Right lower leg: Edema (mild, 1+) present.      Left lower leg: Edema (mild, 1+) present.      Comments: Stiff movements, ataxia, masked facies, bradykinesia   Skin:     General: Skin is warm and dry.   Neurological:      General: No focal deficit present.      Mental Status: He is alert.      Comments: Decreased verbalization and much weaker voice quality   Psychiatric:         Mood and Affect: Mood normal.         Behavior: Behavior normal.         Assessment/Plan  Problem List Items Addressed This Visit             ICD-10-CM    Ataxia R27.0    Dysphagia R13.10    Impaired ambulation R26.2    Multiple system atrophy (Multi) G90.3, G23.8    Repeated falls R29.6    Autonomic orthostatic hypotension I95.1    Aspiration pneumonia (Multi) - Primary J69.0     A.  We will continue with supportive and restorative care for now.  Given the patient's poor to terminal prognosis given multiple system atrophy and I agree with hospice service at this time for ongoing patient comfort.    B.  Laboratory examinations will be held due to hospice care services    C.  The patient's prognosis is  [id number].      Electronically Signed By: Tucker Manrique MD   6/27/24  4:06 PM

## 2024-06-27 NOTE — ASSESSMENT & PLAN NOTE
He developed multifocal aspiration pna.  He failed MBS and per speech therapy  does not appear safe to initiate oral diet.  Goals of care was discussed and he is discharged with plans for admission to hospice care later today.

## 2024-07-02 ENCOUNTER — APPOINTMENT (OUTPATIENT)
Dept: NEUROLOGY | Facility: CLINIC | Age: 69
End: 2024-07-02
Payer: MEDICARE

## 2024-07-12 ENCOUNTER — TELEPHONE (OUTPATIENT)
Dept: PRIMARY CARE | Facility: CLINIC | Age: 69
End: 2024-07-12
Payer: MEDICARE

## 2024-07-16 ENCOUNTER — POST MORTEM DOCUMENTATION (OUTPATIENT)
Dept: PRIMARY CARE | Facility: CLINIC | Age: 69
End: 2024-07-16
Payer: MEDICARE

## 2024-08-07 ENCOUNTER — APPOINTMENT (OUTPATIENT)
Dept: UROLOGY | Facility: CLINIC | Age: 69
End: 2024-08-07
Payer: MEDICARE